# Patient Record
Sex: MALE | Race: WHITE | NOT HISPANIC OR LATINO | Employment: OTHER | URBAN - NONMETROPOLITAN AREA
[De-identification: names, ages, dates, MRNs, and addresses within clinical notes are randomized per-mention and may not be internally consistent; named-entity substitution may affect disease eponyms.]

---

## 2017-06-15 ENCOUNTER — APPOINTMENT (OUTPATIENT)
Dept: GENERAL RADIOLOGY | Facility: HOSPITAL | Age: 72
End: 2017-06-15

## 2017-06-15 ENCOUNTER — HOSPITAL ENCOUNTER (INPATIENT)
Facility: HOSPITAL | Age: 72
LOS: 2 days | Discharge: HOME | End: 2017-06-17
Attending: EMERGENCY MEDICINE | Admitting: INTERNAL MEDICINE

## 2017-06-15 DIAGNOSIS — I21.01 ST ELEVATION MYOCARDIAL INFARCTION INVOLVING LEFT MAIN CORONARY ARTERY (HCC): Primary | ICD-10-CM

## 2017-06-15 PROBLEM — I21.3 STEMI (ST ELEVATION MYOCARDIAL INFARCTION): Status: ACTIVE | Noted: 2017-06-15

## 2017-06-15 LAB
ACT BLD: 125 SECONDS (ref 82–152)
ACT BLD: 158 SECONDS (ref 82–152)
ACT BLD: 158 SECONDS (ref 82–152)
ACT BLD: 197 SECONDS (ref 82–152)
ACT BLD: 219 SECONDS (ref 82–152)
ALBUMIN SERPL-MCNC: 4.7 G/DL (ref 3.4–4.8)
ALBUMIN/GLOB SERPL: 1.2 G/DL (ref 1.5–2.5)
ALP SERPL-CCNC: 104 U/L (ref 40–129)
ALT SERPL W P-5'-P-CCNC: 66 U/L (ref 10–44)
ANION GAP SERPL CALCULATED.3IONS-SCNC: 8.7 MMOL/L (ref 3.6–11.2)
AST SERPL-CCNC: 71 U/L (ref 10–34)
BASOPHILS # BLD AUTO: 0.05 10*3/MM3 (ref 0–0.3)
BASOPHILS NFR BLD AUTO: 0.4 % (ref 0–2)
BILIRUB SERPL-MCNC: 0.8 MG/DL (ref 0.2–1.8)
BUN BLD-MCNC: 19 MG/DL (ref 7–21)
BUN/CREAT SERPL: 16.8 (ref 7–25)
CALCIUM SPEC-SCNC: 10.4 MG/DL (ref 7.7–10)
CHLORIDE SERPL-SCNC: 103 MMOL/L (ref 99–112)
CHOLEST SERPL-MCNC: 235 MG/DL (ref 0–200)
CK SERPL-CCNC: 1523 U/L (ref 24–204)
CO2 SERPL-SCNC: 27.3 MMOL/L (ref 24.3–31.9)
CREAT BLD-MCNC: 1.13 MG/DL (ref 0.43–1.29)
DEPRECATED RDW RBC AUTO: 43.4 FL (ref 37–54)
EOSINOPHIL # BLD AUTO: 0.07 10*3/MM3 (ref 0–0.7)
EOSINOPHIL NFR BLD AUTO: 0.6 % (ref 0–7)
ERYTHROCYTE [DISTWIDTH] IN BLOOD BY AUTOMATED COUNT: 13.1 % (ref 11.5–14.5)
GFR SERPL CREATININE-BSD FRML MDRD: 64 ML/MIN/1.73
GLOBULIN UR ELPH-MCNC: 4 GM/DL
GLUCOSE BLD-MCNC: 246 MG/DL (ref 70–110)
HCT VFR BLD AUTO: 49.9 % (ref 42–52)
HDLC SERPL-MCNC: 42 MG/DL (ref 60–100)
HGB BLD-MCNC: 16.6 G/DL (ref 14–18)
HOLD SPECIMEN: NORMAL
HOLD SPECIMEN: NORMAL
IMM GRANULOCYTES # BLD: 0.03 10*3/MM3 (ref 0–0.03)
IMM GRANULOCYTES NFR BLD: 0.2 % (ref 0–0.5)
INR PPP: 0.97 (ref 0.8–1.1)
LDLC SERPL CALC-MCNC: 159 MG/DL (ref 0–100)
LDLC/HDLC SERPL: 3.78 {RATIO}
LYMPHOCYTES # BLD AUTO: 1.78 10*3/MM3 (ref 1–3)
LYMPHOCYTES NFR BLD AUTO: 14.3 % (ref 16–46)
MCH RBC QN AUTO: 30.4 PG (ref 27–33)
MCHC RBC AUTO-ENTMCNC: 33.3 G/DL (ref 33–37)
MCV RBC AUTO: 91.4 FL (ref 80–94)
MONOCYTES # BLD AUTO: 0.73 10*3/MM3 (ref 0.1–0.9)
MONOCYTES NFR BLD AUTO: 5.9 % (ref 0–12)
NEUTROPHILS # BLD AUTO: 9.78 10*3/MM3 (ref 1.4–6.5)
NEUTROPHILS NFR BLD AUTO: 78.6 % (ref 40–75)
OSMOLALITY SERPL CALC.SUM OF ELEC: 288 MOSM/KG (ref 273–305)
PLATELET # BLD AUTO: 235 10*3/MM3 (ref 130–400)
PMV BLD AUTO: 10.2 FL (ref 6–10)
POTASSIUM BLD-SCNC: 4.1 MMOL/L (ref 3.5–5.3)
PROT SERPL-MCNC: 8.7 G/DL (ref 6–8)
PROTHROMBIN TIME: 10.7 SECONDS (ref 9.8–11.9)
RBC # BLD AUTO: 5.46 10*6/MM3 (ref 4.7–6.1)
SODIUM BLD-SCNC: 139 MMOL/L (ref 135–153)
TRIGL SERPL-MCNC: 171 MG/DL (ref 0–150)
TROPONIN I SERPL-MCNC: 3.43 NG/ML
TROPONIN I SERPL-MCNC: >50 NG/ML
VLDLC SERPL-MCNC: 34.2 MG/DL
WBC NRBC COR # BLD: 12.44 10*3/MM3 (ref 4.5–12.5)
WHOLE BLOOD HOLD SPECIMEN: NORMAL
WHOLE BLOOD HOLD SPECIMEN: NORMAL

## 2017-06-15 PROCEDURE — 0 IOPAMIDOL PER 1 ML: Performed by: INTERNAL MEDICINE

## 2017-06-15 PROCEDURE — 25010000002 HEPARIN (PORCINE) PER 1000 UNITS: Performed by: INTERNAL MEDICINE

## 2017-06-15 PROCEDURE — 93005 ELECTROCARDIOGRAM TRACING: CPT | Performed by: EMERGENCY MEDICINE

## 2017-06-15 PROCEDURE — 93454 CORONARY ARTERY ANGIO S&I: CPT | Performed by: INTERNAL MEDICINE

## 2017-06-15 PROCEDURE — C1874 STENT, COATED/COV W/DEL SYS: HCPCS | Performed by: INTERNAL MEDICINE

## 2017-06-15 PROCEDURE — 84484 ASSAY OF TROPONIN QUANT: CPT | Performed by: EMERGENCY MEDICINE

## 2017-06-15 PROCEDURE — 4A023N7 MEASUREMENT OF CARDIAC SAMPLING AND PRESSURE, LEFT HEART, PERCUTANEOUS APPROACH: ICD-10-PCS | Performed by: INTERNAL MEDICINE

## 2017-06-15 PROCEDURE — 25010000002 MIDAZOLAM PER 1 MG: Performed by: INTERNAL MEDICINE

## 2017-06-15 PROCEDURE — C1769 GUIDE WIRE: HCPCS | Performed by: INTERNAL MEDICINE

## 2017-06-15 PROCEDURE — 85347 COAGULATION TIME ACTIVATED: CPT

## 2017-06-15 PROCEDURE — 85610 PROTHROMBIN TIME: CPT | Performed by: EMERGENCY MEDICINE

## 2017-06-15 PROCEDURE — 93458 L HRT ARTERY/VENTRICLE ANGIO: CPT | Performed by: INTERNAL MEDICINE

## 2017-06-15 PROCEDURE — 80061 LIPID PANEL: CPT | Performed by: EMERGENCY MEDICINE

## 2017-06-15 PROCEDURE — 25010000002 DIPHENHYDRAMINE PER 50 MG: Performed by: INTERNAL MEDICINE

## 2017-06-15 PROCEDURE — 92941 PRQ TRLML REVSC TOT OCCL AMI: CPT | Performed by: INTERNAL MEDICINE

## 2017-06-15 PROCEDURE — C1887 CATHETER, GUIDING: HCPCS | Performed by: INTERNAL MEDICINE

## 2017-06-15 PROCEDURE — 80053 COMPREHEN METABOLIC PANEL: CPT | Performed by: EMERGENCY MEDICINE

## 2017-06-15 PROCEDURE — 25010000002 FENTANYL CITRATE (PF) 100 MCG/2ML SOLUTION: Performed by: INTERNAL MEDICINE

## 2017-06-15 PROCEDURE — B2151ZZ FLUOROSCOPY OF LEFT HEART USING LOW OSMOLAR CONTRAST: ICD-10-PCS | Performed by: INTERNAL MEDICINE

## 2017-06-15 PROCEDURE — 82550 ASSAY OF CK (CPK): CPT | Performed by: INTERNAL MEDICINE

## 2017-06-15 PROCEDURE — 85025 COMPLETE CBC W/AUTO DIFF WBC: CPT | Performed by: EMERGENCY MEDICINE

## 2017-06-15 PROCEDURE — C1725 CATH, TRANSLUMIN NON-LASER: HCPCS | Performed by: INTERNAL MEDICINE

## 2017-06-15 PROCEDURE — 0270346 DILATION OF CORONARY ARTERY, ONE ARTERY, BIFURCATION, WITH DRUG-ELUTING INTRALUMINAL DEVICE, PERCUTANEOUS APPROACH: ICD-10-PCS | Performed by: INTERNAL MEDICINE

## 2017-06-15 PROCEDURE — 99284 EMERGENCY DEPT VISIT MOD MDM: CPT

## 2017-06-15 PROCEDURE — 99223 1ST HOSP IP/OBS HIGH 75: CPT | Performed by: INTERNAL MEDICINE

## 2017-06-15 PROCEDURE — 93005 ELECTROCARDIOGRAM TRACING: CPT | Performed by: INTERNAL MEDICINE

## 2017-06-15 PROCEDURE — 94799 UNLISTED PULMONARY SVC/PX: CPT

## 2017-06-15 PROCEDURE — C1894 INTRO/SHEATH, NON-LASER: HCPCS | Performed by: INTERNAL MEDICINE

## 2017-06-15 PROCEDURE — B2111ZZ FLUOROSCOPY OF MULTIPLE CORONARY ARTERIES USING LOW OSMOLAR CONTRAST: ICD-10-PCS | Performed by: INTERNAL MEDICINE

## 2017-06-15 PROCEDURE — 84484 ASSAY OF TROPONIN QUANT: CPT | Performed by: INTERNAL MEDICINE

## 2017-06-15 PROCEDURE — 25010000002 HEPARIN (PORCINE) PER 1000 UNITS: Performed by: EMERGENCY MEDICINE

## 2017-06-15 PROCEDURE — C9606 PERC D-E COR REVASC W AMI S: HCPCS | Performed by: INTERNAL MEDICINE

## 2017-06-15 DEVICE — XIENCE ALPINE EVEROLIMUS ELUTING CORONARY STENT SYSTEM 3.00 MM X 38 MM / RAPID-EXCHANGE
Type: IMPLANTABLE DEVICE | Status: FUNCTIONAL
Brand: XIENCE ALPINE

## 2017-06-15 RX ORDER — ASPIRIN 325 MG
325 TABLET, DELAYED RELEASE (ENTERIC COATED) ORAL DAILY
Status: DISCONTINUED | OUTPATIENT
Start: 2017-06-16 | End: 2017-06-17 | Stop reason: HOSPADM

## 2017-06-15 RX ORDER — SODIUM CHLORIDE 0.9 % (FLUSH) 0.9 %
1-10 SYRINGE (ML) INJECTION AS NEEDED
Status: DISCONTINUED | OUTPATIENT
Start: 2017-06-15 | End: 2017-06-17 | Stop reason: HOSPADM

## 2017-06-15 RX ORDER — NITROGLYCERIN 20 MG/100ML
5-200 INJECTION INTRAVENOUS
Status: DISCONTINUED | OUTPATIENT
Start: 2017-06-15 | End: 2017-06-16

## 2017-06-15 RX ORDER — HYDRALAZINE HYDROCHLORIDE 20 MG/ML
10 INJECTION INTRAMUSCULAR; INTRAVENOUS ONCE
Status: DISCONTINUED | OUTPATIENT
Start: 2017-06-15 | End: 2017-06-17 | Stop reason: HOSPADM

## 2017-06-15 RX ORDER — FENTANYL CITRATE 50 UG/ML
INJECTION, SOLUTION INTRAMUSCULAR; INTRAVENOUS AS NEEDED
Status: DISCONTINUED | OUTPATIENT
Start: 2017-06-15 | End: 2017-06-15 | Stop reason: HOSPADM

## 2017-06-15 RX ORDER — SODIUM CHLORIDE 9 MG/ML
100 INJECTION, SOLUTION INTRAVENOUS CONTINUOUS
Status: ACTIVE | OUTPATIENT
Start: 2017-06-15 | End: 2017-06-16

## 2017-06-15 RX ORDER — HEPARIN SODIUM 5000 [USP'U]/ML
INJECTION, SOLUTION INTRAVENOUS; SUBCUTANEOUS
Status: DISPENSED
Start: 2017-06-15 | End: 2017-06-16

## 2017-06-15 RX ORDER — CLOPIDOGREL BISULFATE 75 MG/1
600 TABLET ORAL ONCE
Status: DISCONTINUED | OUTPATIENT
Start: 2017-06-15 | End: 2017-06-15 | Stop reason: SDUPTHER

## 2017-06-15 RX ORDER — NITROGLYCERIN 0.4 MG/1
0.4 TABLET SUBLINGUAL
Status: DISCONTINUED | OUTPATIENT
Start: 2017-06-15 | End: 2017-06-17 | Stop reason: HOSPADM

## 2017-06-15 RX ORDER — HYDROCODONE BITARTRATE AND ACETAMINOPHEN 7.5; 325 MG/1; MG/1
1 TABLET ORAL EVERY 4 HOURS PRN
Status: DISCONTINUED | OUTPATIENT
Start: 2017-06-15 | End: 2017-06-17 | Stop reason: HOSPADM

## 2017-06-15 RX ORDER — HEPARIN SODIUM 1000 [USP'U]/ML
INJECTION, SOLUTION INTRAVENOUS; SUBCUTANEOUS AS NEEDED
Status: DISCONTINUED | OUTPATIENT
Start: 2017-06-15 | End: 2017-06-15 | Stop reason: HOSPADM

## 2017-06-15 RX ORDER — LIDOCAINE HYDROCHLORIDE 20 MG/ML
INJECTION, SOLUTION INFILTRATION; PERINEURAL AS NEEDED
Status: DISCONTINUED | OUTPATIENT
Start: 2017-06-15 | End: 2017-06-15 | Stop reason: HOSPADM

## 2017-06-15 RX ORDER — HEPARIN SODIUM 5000 [USP'U]/ML
26.7 INJECTION, SOLUTION INTRAVENOUS; SUBCUTANEOUS ONCE
Status: COMPLETED | OUTPATIENT
Start: 2017-06-15 | End: 2017-06-15

## 2017-06-15 RX ORDER — CLOPIDOGREL BISULFATE 75 MG/1
600 TABLET ORAL ONCE
Status: COMPLETED | OUTPATIENT
Start: 2017-06-15 | End: 2017-06-15

## 2017-06-15 RX ORDER — MIDAZOLAM HYDROCHLORIDE 1 MG/ML
INJECTION INTRAMUSCULAR; INTRAVENOUS AS NEEDED
Status: DISCONTINUED | OUTPATIENT
Start: 2017-06-15 | End: 2017-06-15 | Stop reason: HOSPADM

## 2017-06-15 RX ORDER — NITROGLYCERIN 0.4 MG/1
TABLET SUBLINGUAL
Status: COMPLETED
Start: 2017-06-15 | End: 2017-06-15

## 2017-06-15 RX ORDER — ATORVASTATIN CALCIUM 40 MG/1
40 TABLET, FILM COATED ORAL NIGHTLY
Status: DISCONTINUED | OUTPATIENT
Start: 2017-06-15 | End: 2017-06-17 | Stop reason: HOSPADM

## 2017-06-15 RX ORDER — TAMSULOSIN HYDROCHLORIDE 0.4 MG/1
0.4 CAPSULE ORAL NIGHTLY
Status: DISCONTINUED | OUTPATIENT
Start: 2017-06-15 | End: 2017-06-17 | Stop reason: HOSPADM

## 2017-06-15 RX ORDER — LISINOPRIL 2.5 MG/1
5 TABLET ORAL DAILY
Status: DISCONTINUED | OUTPATIENT
Start: 2017-06-15 | End: 2017-06-17 | Stop reason: HOSPADM

## 2017-06-15 RX ORDER — MULTIPLE VITAMINS W/ MINERALS TAB 9MG-400MCG
1 TAB ORAL DAILY
COMMUNITY
End: 2017-08-07

## 2017-06-15 RX ORDER — SODIUM CHLORIDE 9 MG/ML
100 INJECTION, SOLUTION INTRAVENOUS CONTINUOUS
Status: DISCONTINUED | OUTPATIENT
Start: 2017-06-15 | End: 2017-06-15 | Stop reason: SDUPTHER

## 2017-06-15 RX ORDER — TAMSULOSIN HYDROCHLORIDE 0.4 MG/1
1 CAPSULE ORAL NIGHTLY
COMMUNITY
End: 2020-06-18

## 2017-06-15 RX ORDER — SODIUM CHLORIDE 9 MG/ML
INJECTION, SOLUTION INTRAVENOUS CONTINUOUS PRN
Status: DISCONTINUED | OUTPATIENT
Start: 2017-06-15 | End: 2017-06-15 | Stop reason: HOSPADM

## 2017-06-15 RX ORDER — ASPIRIN 81 MG/1
TABLET, CHEWABLE ORAL
Status: COMPLETED
Start: 2017-06-15 | End: 2017-06-15

## 2017-06-15 RX ORDER — CARVEDILOL 3.12 MG/1
3.12 TABLET ORAL 2 TIMES DAILY
Status: DISCONTINUED | OUTPATIENT
Start: 2017-06-15 | End: 2017-06-16

## 2017-06-15 RX ORDER — CLOPIDOGREL BISULFATE 75 MG/1
TABLET ORAL
Status: COMPLETED
Start: 2017-06-15 | End: 2017-06-15

## 2017-06-15 RX ORDER — DIPHENHYDRAMINE HYDROCHLORIDE 50 MG/ML
INJECTION INTRAMUSCULAR; INTRAVENOUS AS NEEDED
Status: DISCONTINUED | OUTPATIENT
Start: 2017-06-15 | End: 2017-06-15 | Stop reason: HOSPADM

## 2017-06-15 RX ORDER — SODIUM CHLORIDE 0.9 % (FLUSH) 0.9 %
10 SYRINGE (ML) INJECTION AS NEEDED
Status: DISCONTINUED | OUTPATIENT
Start: 2017-06-15 | End: 2017-06-17 | Stop reason: HOSPADM

## 2017-06-15 RX ORDER — CLOPIDOGREL BISULFATE 75 MG/1
75 TABLET ORAL DAILY
Status: DISCONTINUED | OUTPATIENT
Start: 2017-06-16 | End: 2017-06-17 | Stop reason: HOSPADM

## 2017-06-15 RX ORDER — ASPIRIN 81 MG/1
324 TABLET, CHEWABLE ORAL ONCE
Status: COMPLETED | OUTPATIENT
Start: 2017-06-15 | End: 2017-06-15

## 2017-06-15 RX ORDER — NITROGLYCERIN 5 MG/ML
INJECTION, SOLUTION INTRAVENOUS AS NEEDED
Status: DISCONTINUED | OUTPATIENT
Start: 2017-06-15 | End: 2017-06-15 | Stop reason: HOSPADM

## 2017-06-15 RX ORDER — ACETAMINOPHEN 325 MG/1
650 TABLET ORAL EVERY 4 HOURS PRN
Status: DISCONTINUED | OUTPATIENT
Start: 2017-06-15 | End: 2017-06-17 | Stop reason: HOSPADM

## 2017-06-15 RX ORDER — ASPIRIN 81 MG/1
81 TABLET ORAL DAILY
COMMUNITY
End: 2017-06-17 | Stop reason: HOSPADM

## 2017-06-15 RX ADMIN — ASPIRIN 324 MG: 81 TABLET, CHEWABLE ORAL at 13:39

## 2017-06-15 RX ADMIN — NITROGLYCERIN 10 MCG/MIN: 20 INJECTION INTRAVENOUS at 16:30

## 2017-06-15 RX ADMIN — NITROGLYCERIN 0.4 MG: 0.4 TABLET SUBLINGUAL at 13:39

## 2017-06-15 RX ADMIN — TAMSULOSIN HYDROCHLORIDE 0.4 MG: 0.4 CAPSULE ORAL at 20:07

## 2017-06-15 RX ADMIN — CLOPIDOGREL BISULFATE 600 MG: 75 TABLET, FILM COATED ORAL at 13:39

## 2017-06-15 RX ADMIN — CLOPIDOGREL BISULFATE 600 MG: 75 TABLET ORAL at 13:39

## 2017-06-15 RX ADMIN — ATORVASTATIN CALCIUM 40 MG: 40 TABLET, FILM COATED ORAL at 20:07

## 2017-06-15 RX ADMIN — SODIUM CHLORIDE 100 ML/HR: 9 INJECTION, SOLUTION INTRAVENOUS at 18:14

## 2017-06-15 RX ADMIN — HEPARIN SODIUM 4000 UNITS: 5000 INJECTION, SOLUTION INTRAVENOUS; SUBCUTANEOUS at 13:42

## 2017-06-15 NOTE — H&P
Chief complaint:  Chest pain    History of present illness:  Mr. Roth is an interesting 72-year-old gentleman who presents with a history of chest pain.  He notes with chest discomfort has been persistent over the past week.  His symptoms became severe and he presented to the emergency department today.  He denies history of known coronary artery disease or prior myocardial infarction.  He denies a history of congestive heart failure.  He denies any prior history of cardiac arrhythmias.    His 14 point review of systems is negative as was otherwise mentioned in history of present illness    Past medical history:  Obesity  BPH    Current medications:  Flomax 0.4 mg daily  Aspirin 81 mg daily    He has no known drug allergies.    Social history is notable for no significant tobacco, alcohol or illicit drug use    Family history is unremarkable for premature coronary artery disease    Current physical examination:  Blood pressure is 150/100 and heart rate is 100  In general he is an obese gentleman in no apparent distress, alert and oriented ×3  HEENT exam reveals oral mucosa to be normal.  Has no significant JVD or hepatojugular reflex.  He has no carotid bruits noted.  Chest is symmetric  Lungs are clear to auscultation without crackles or wheezes  Cardiac exam reveals an intact PMI with a regular rate and rhythm.  There is a normal S1 and S2.  There is no S3 or S4.  There are no murmurs rubs or bruits.  Abdominal exam reveals a soft flat belly which is nontender with normal bowel sounds and no paraspinal mainly.  Is no mass noted.  His abdominal aorta is not palpable  Extremities show no clubbing cyanosis or edema  Peripheral pulses are intact  Muscle skeletal exam is normal  Neurologic exam is normal    Currently laboratory data:  EKG from now reveals normal sinus rhythm with ST elevation anterior leads    Final impression and plan:  Overall it is my impression that Mr. Roth is presenting with an acute  anterior wall myocardial infarction and will be taken emergently to the cardiac catheterization laboratory.  He has been given informed consent apprising him of the risk of heart attack, stroke and death.  He also has been instructed in the importance of dual antiplatelet therapy.  He understands all this and agrees to the plan as outlined.

## 2017-06-15 NOTE — ED PROVIDER NOTES
Subjective   Patient is a 72 y.o. male presenting with chest pain.   Chest Pain   Pain location:  Substernal area  Pain quality: aching    Pain radiates to:  Does not radiate  Pain severity:  Severe  Duration:  1 week  Progression:  Worsening  Relieved by:  Nothing  Worsened by:  Nothing  Ineffective treatments:  None tried      Review of Systems   Unable to perform ROS: Acuity of condition   Cardiovascular: Positive for chest pain.       Past Medical History:   Diagnosis Date   • Arthritis    • Coronary artery disease    • Diabetes mellitus        No Known Allergies    Past Surgical History:   Procedure Laterality Date   • BACK SURGERY     • HERNIA REPAIR     • TONSILLECTOMY     • VASECTOMY         Family History   Problem Relation Age of Onset   • Cancer Mother    • Early death Mother    • Heart disease Father    • Diabetes Sister    • Heart disease Paternal Uncle    • Diabetes Maternal Grandfather        Social History     Social History   • Marital status:      Spouse name: N/A   • Number of children: N/A   • Years of education: N/A     Social History Main Topics   • Smoking status: Former Smoker     Packs/day: 1.00     Years: 15.00     Types: Cigarettes, Pipe, Cigars   • Smokeless tobacco: Current User     Types: Chew      Comment: quit smoking, still chews   • Alcohol use No   • Drug use: No   • Sexual activity: Defer     Other Topics Concern   • None     Social History Narrative   • None         Objective   Physical Exam   Constitutional: He is oriented to person, place, and time. He appears well-developed and well-nourished. No distress.   HENT:   Head: Normocephalic and atraumatic.   Right Ear: External ear normal.   Left Ear: External ear normal.   Nose: Nose normal.   Eyes: Conjunctivae and EOM are normal. Pupils are equal, round, and reactive to light.   Neck: Normal range of motion. Neck supple. No JVD present. No tracheal deviation present.   Cardiovascular:   No murmur heard.  Pulmonary/Chest:  Effort normal and breath sounds normal. No respiratory distress. He has no wheezes.   Abdominal: Soft. Bowel sounds are normal. There is no tenderness.   Musculoskeletal: Normal range of motion. He exhibits no edema or deformity.   Neurological: He is alert and oriented to person, place, and time. No cranial nerve deficit.   Skin: Skin is warm and dry. No rash noted. He is not diaphoretic. No erythema. No pallor.   Psychiatric: He has a normal mood and affect. His behavior is normal. Thought content normal.   Nursing note and vitals reviewed.      Procedures         LAB RESULTS  Lab Results (last 24 hours)     Procedure Component Value Units Date/Time    CBC & Differential [[id number]] Collected:  06/15/17 1349    Specimen:  Blood Updated:  06/15/17 1407    Narrative:       The following orders were created for panel order CBC & Differential.  Procedure                               Abnormality         Status                     ---------                               -----------         ------                     CBC Auto Differential[[id number]]        Abnormal            Final result                 Please view results for these tests on the individual orders.    Troponin [[id number]]  (Abnormal) Collected:  06/15/17 1349    Specimen:  Blood from Arm, Right Updated:  06/15/17 1440     Troponin I 3.428 (C) ng/mL     Narrative:       Ultra Troponin I Reference Range:         <=0.039 ng/mL: Negative    0.04-0.779 ng/mL: Indeterminate Range. Suspicious of MI.  Clinical correlation required.       >=0.78  ng/mL: Consistent with myocardial injury.  Clinical correlation required.    Comprehensive Metabolic Panel [[id number]]  (Abnormal) Collected:  06/15/17 1349    Specimen:  Blood from Arm, Right Updated:  06/15/17 1426     Glucose 246 (H) mg/dL      BUN 19 mg/dL      Creatinine 1.13 mg/dL      Sodium 139 mmol/L      Potassium 4.1 mmol/L       1+ Hemolysis         Chloride 103 mmol/L      CO2 27.3 mmol/L      Calcium 10.4  (H) mg/dL      Total Protein 8.7 (H) g/dL      Albumin 4.70 g/dL      ALT (SGPT) 66 (H) U/L      AST (SGOT) 71 (H) U/L      Alkaline Phosphatase 104 U/L       Note New Reference Ranges        Total Bilirubin 0.8 mg/dL      eGFR Non African Amer 64 mL/min/1.73      Globulin 4.0 gm/dL      A/G Ratio 1.2 (L) g/dL      BUN/Creatinine Ratio 16.8     Anion Gap 8.7 mmol/L     Narrative:       The MDRD GFR formula is only valid for adults with stable renal function between ages 18 and 70.    Protime-INR [[id number]]  (Normal) Collected:  06/15/17 1349    Specimen:  Blood from Arm, Right Updated:  06/15/17 1417     Protime 10.7 Seconds      INR 0.97    Narrative:       Patients not on anticoagulant therapy:    INR 0.90-1.10     Suggested INR therapeutic range for stable oral anticoagulant therapy:             Routine therapy                      2.00-3.00           Recurrent MI                         2.50-3.50           Mechanical prosthetic valve          2.50-3.50    CBC Auto Differential [[id number]]  (Abnormal) Collected:  06/15/17 1349    Specimen:  Blood from Arm, Right Updated:  06/15/17 1407     WBC 12.44 10*3/mm3      RBC 5.46 10*6/mm3      Hemoglobin 16.6 g/dL      Hematocrit 49.9 %      MCV 91.4 fL      MCH 30.4 pg      MCHC 33.3 g/dL      RDW 13.1 %      RDW-SD 43.4 fl      MPV 10.2 (H) fL      Platelets 235 10*3/mm3      Neutrophil % 78.6 (H) %      Lymphocyte % 14.3 (L) %      Monocyte % 5.9 %      Eosinophil % 0.6 %      Basophil % 0.4 %      Immature Grans % 0.2 %      Neutrophils, Absolute 9.78 (H) 10*3/mm3      Lymphocytes, Absolute 1.78 10*3/mm3      Monocytes, Absolute 0.73 10*3/mm3      Eosinophils, Absolute 0.07 10*3/mm3      Basophils, Absolute 0.05 10*3/mm3      Immature Grans, Absolute 0.03 10*3/mm3     Lipid Panel [[id number]]  (Abnormal) Collected:  06/15/17 1349    Specimen:  Blood from Arm, Right Updated:  06/15/17 1449     Total Cholesterol 235 (H) mg/dL      Triglycerides 171 (H) mg/dL      HDL  Cholesterol 42 (L) mg/dL      LDL Cholesterol  159 (H) mg/dL      VLDL Cholesterol 34.2 mg/dL      LDL/HDL Ratio 3.78    Narrative:       Cholesterol Reference Ranges  (U.S. Department of Health and Human Services ATP III Classifications)    Desirable          <200 mg/dL  Borderline High    200-239 mg/dL  High Risk          >240 mg/dL      Triglyceride Reference Ranges  (U.S. Department of Health and Human Services ATP III Classifications)    Normal           <150 mg/dL  Borderline High  150-199 mg/dL  High             200-499 mg/dL  Very High        >500 mg/dL    HDL Reference Ranges  (U.S. Department of Health and Human Services ATP III Classifcations)    Low     <40 mg/dl (major risk factor for CHD)  High    >60 mg/dl ('negative' risk factor for CHD)        LDL Reference Ranges  (U.S. Department of Health and Human Services ATP III Classifcations)    Optimal          <100 mg/dL  Near Optimal     100-129 mg/dL  Borderline High  130-159 mg/dL  High             160-189 mg/dL  Very High        >189 mg/dL    POC Activated Clotting Time [[id number]]  (Abnormal) Collected:  06/15/17 1403    Specimen:  Blood Updated:  06/15/17 1446     Activated Clotting Time  158 (H) Seconds       Serial Number: [id number]    : [id number]       POC Activated Clotting Time [[id number]]  (Abnormal) Collected:  06/15/17 1417    Specimen:  Blood Updated:  06/15/17 1446     Activated Clotting Time  219 (H) Seconds       Serial Number: [id number]    : [id number]       POC Activated Clotting Time [[id number]]  (Abnormal) Collected:  06/15/17 1433    Specimen:  Blood Updated:  06/15/17 1446     Activated Clotting Time  197 (H) Seconds       Serial Number: [id number]    : [id number]             I ordered the above labs and reviewed the results    RADIOLOGY  No orders to display        I ordered the above noted radiological studies. Interpreted by radiologist. Reviewed by me in PACS.     ED Course  ED Course   Value Comment By Time   ECG 12 Lead EKG  shows ST elevations in V3 45 and in lead 1 consistent with STEMI Brian Davis MD 06/15 8969       After EKG done in the ST elevations one push was done Dr. Mcneill notified any protocol initiated              MDM    Final diagnoses:   ST elevation myocardial infarction involving left main coronary artery       Documentation assistance provided by aron Davis.  Information recorded by the scribe was done at my direction and has been verified and validated by me.     Elia Davis  06/15/17 9691       Brian Davis MD  06/15/17 0463

## 2017-06-15 NOTE — ED NOTES
Groin prep complete, defib pads placed, pt transported to cath lab.     Vivi Carcamo RN  06/15/17 4263

## 2017-06-15 NOTE — PLAN OF CARE
Problem: Patient Care Overview (Adult)  Goal: Discharge Needs Assessment  Outcome: Ongoing (interventions implemented as appropriate)    06/15/17 1559 06/15/17 1939   Discharge Needs Assessment   Discharge Planning Comments --  pt emergently taken to cath lab today and a stent was placed in pts LDA   Living Environment   Transportation Available car --          Problem: Acute Coronary Syndrome (ACS) (Adult)  Goal: Signs and Symptoms of Listed Potential Problems Will be Absent or Manageable (Acute Coronary Syndrome)  Outcome: Ongoing (interventions implemented as appropriate)    06/15/17 1850   Acute Coronary Syndrome (ACS)   Problems Assessed (Acute Coronary Syndrome (ACS)) chest pain (angina);dysrhythmia/arrhythmia   Problems Present (Acute Coronary Syndrome (ACS)) chest pain (angina);ischemia leading to infarction         Problem: Pain, Acute (Adult)  Goal: Identify Related Risk Factors and Signs and Symptoms  Outcome: Ongoing (interventions implemented as appropriate)    06/15/17 1833   Pain, Acute   Related Risk Factors (Acute Pain) disease process   Signs and Symptoms (Acute Pain) fatigue/weakness;verbalization of pain descriptors       Goal: Acceptable Pain Control/Comfort Level  Outcome: Ongoing (interventions implemented as appropriate)    06/15/17 1833   Pain, Acute (Adult)   Acceptable Pain Control/Comfort Level making progress toward outcome

## 2017-06-15 NOTE — ED NOTES
Pt reports he has been experiencing chest pain for about 8 days, states pain is somewhat relieved after belching, reports pain worsened today.     Vivi Carcamo RN  06/15/17 1356

## 2017-06-15 NOTE — ED NOTES
One push per dr.douglas barone notified      Brian Hilliard  06/15/17 3781       Brian Hilliard  06/15/17 4184

## 2017-06-15 NOTE — ED NOTES
EKG performed by [facility] at 1325 and shown to Dr. Davis.      Ebony Alcaraz  06/15/17 2691

## 2017-06-16 ENCOUNTER — APPOINTMENT (OUTPATIENT)
Dept: CARDIOLOGY | Facility: HOSPITAL | Age: 72
End: 2017-06-16
Attending: INTERNAL MEDICINE

## 2017-06-16 LAB
ALBUMIN SERPL-MCNC: 3.5 G/DL (ref 3.4–4.8)
ALBUMIN/GLOB SERPL: 1.2 G/DL (ref 1.5–2.5)
ALP SERPL-CCNC: 76 U/L (ref 40–129)
ALT SERPL W P-5'-P-CCNC: 62 U/L (ref 10–44)
ANION GAP SERPL CALCULATED.3IONS-SCNC: 3.5 MMOL/L (ref 3.6–11.2)
AST SERPL-CCNC: 144 U/L (ref 10–34)
BILIRUB SERPL-MCNC: 1.1 MG/DL (ref 0.2–1.8)
BNP SERPL-MCNC: 271 PG/ML (ref 0–100)
BUN BLD-MCNC: 14 MG/DL (ref 7–21)
BUN/CREAT SERPL: 15.1 (ref 7–25)
CALCIUM SPEC-SCNC: 8.9 MG/DL (ref 7.7–10)
CHLORIDE SERPL-SCNC: 105 MMOL/L (ref 99–112)
CHOLEST SERPL-MCNC: 171 MG/DL (ref 0–200)
CK SERPL-CCNC: 1090 U/L (ref 24–204)
CK SERPL-CCNC: 728 U/L (ref 24–204)
CO2 SERPL-SCNC: 27.5 MMOL/L (ref 24.3–31.9)
CREAT BLD-MCNC: 0.93 MG/DL (ref 0.43–1.29)
DEPRECATED RDW RBC AUTO: 43.6 FL (ref 37–54)
ERYTHROCYTE [DISTWIDTH] IN BLOOD BY AUTOMATED COUNT: 13.2 % (ref 11.5–14.5)
GFR SERPL CREATININE-BSD FRML MDRD: 80 ML/MIN/1.73
GLOBULIN UR ELPH-MCNC: 3 GM/DL
GLUCOSE BLD-MCNC: 230 MG/DL (ref 70–110)
GLUCOSE BLDC GLUCOMTR-MCNC: 204 MG/DL (ref 70–130)
HBA1C MFR BLD: 8.2 % (ref 4.5–5.7)
HCT VFR BLD AUTO: 41 % (ref 42–52)
HDLC SERPL-MCNC: 31 MG/DL (ref 60–100)
HGB BLD-MCNC: 13.7 G/DL (ref 14–18)
INR PPP: 1 (ref 0.8–1.1)
LDLC SERPL CALC-MCNC: 119 MG/DL (ref 0–100)
LDLC/HDLC SERPL: 3.85 {RATIO}
MCH RBC QN AUTO: 31.1 PG (ref 27–33)
MCHC RBC AUTO-ENTMCNC: 33.4 G/DL (ref 33–37)
MCV RBC AUTO: 93 FL (ref 80–94)
OSMOLALITY SERPL CALC.SUM OF ELEC: 279.7 MOSM/KG (ref 273–305)
PLATELET # BLD AUTO: 182 10*3/MM3 (ref 130–400)
PMV BLD AUTO: 9.9 FL (ref 6–10)
POTASSIUM BLD-SCNC: 3.7 MMOL/L (ref 3.5–5.3)
PROT SERPL-MCNC: 6.5 G/DL (ref 6–8)
PROTHROMBIN TIME: 11.1 SECONDS (ref 9.8–11.9)
RBC # BLD AUTO: 4.41 10*6/MM3 (ref 4.7–6.1)
SODIUM BLD-SCNC: 136 MMOL/L (ref 135–153)
TRIGL SERPL-MCNC: 104 MG/DL (ref 0–150)
TROPONIN I SERPL-MCNC: >50 NG/ML
TROPONIN I SERPL-MCNC: >50 NG/ML
TSH SERPL DL<=0.05 MIU/L-ACNC: 1.8 MIU/ML (ref 0.55–4.78)
VLDLC SERPL-MCNC: 20.8 MG/DL
WBC NRBC COR # BLD: 11.59 10*3/MM3 (ref 4.5–12.5)

## 2017-06-16 PROCEDURE — 83036 HEMOGLOBIN GLYCOSYLATED A1C: CPT | Performed by: INTERNAL MEDICINE

## 2017-06-16 PROCEDURE — 82550 ASSAY OF CK (CPK): CPT | Performed by: INTERNAL MEDICINE

## 2017-06-16 PROCEDURE — 63710000001 INSULIN ASPART PER 5 UNITS: Performed by: INTERNAL MEDICINE

## 2017-06-16 PROCEDURE — 85027 COMPLETE CBC AUTOMATED: CPT | Performed by: INTERNAL MEDICINE

## 2017-06-16 PROCEDURE — 84443 ASSAY THYROID STIM HORMONE: CPT | Performed by: INTERNAL MEDICINE

## 2017-06-16 PROCEDURE — 84484 ASSAY OF TROPONIN QUANT: CPT | Performed by: INTERNAL MEDICINE

## 2017-06-16 PROCEDURE — C8929 TTE W OR WO FOL WCON,DOPPLER: HCPCS

## 2017-06-16 PROCEDURE — 93010 ELECTROCARDIOGRAM REPORT: CPT | Performed by: INTERNAL MEDICINE

## 2017-06-16 PROCEDURE — 80061 LIPID PANEL: CPT | Performed by: INTERNAL MEDICINE

## 2017-06-16 PROCEDURE — 93005 ELECTROCARDIOGRAM TRACING: CPT | Performed by: INTERNAL MEDICINE

## 2017-06-16 PROCEDURE — 80053 COMPREHEN METABOLIC PANEL: CPT | Performed by: INTERNAL MEDICINE

## 2017-06-16 PROCEDURE — 99232 SBSQ HOSP IP/OBS MODERATE 35: CPT | Performed by: INTERNAL MEDICINE

## 2017-06-16 PROCEDURE — 93306 TTE W/DOPPLER COMPLETE: CPT

## 2017-06-16 PROCEDURE — 83880 ASSAY OF NATRIURETIC PEPTIDE: CPT | Performed by: INTERNAL MEDICINE

## 2017-06-16 PROCEDURE — 93306 TTE W/DOPPLER COMPLETE: CPT | Performed by: INTERNAL MEDICINE

## 2017-06-16 PROCEDURE — 82962 GLUCOSE BLOOD TEST: CPT

## 2017-06-16 PROCEDURE — 94799 UNLISTED PULMONARY SVC/PX: CPT

## 2017-06-16 PROCEDURE — 85610 PROTHROMBIN TIME: CPT | Performed by: INTERNAL MEDICINE

## 2017-06-16 RX ORDER — DEXTROSE MONOHYDRATE 25 G/50ML
25 INJECTION, SOLUTION INTRAVENOUS
Status: DISCONTINUED | OUTPATIENT
Start: 2017-06-16 | End: 2017-06-17 | Stop reason: HOSPADM

## 2017-06-16 RX ORDER — NICOTINE POLACRILEX 4 MG
15 LOZENGE BUCCAL
Status: DISCONTINUED | OUTPATIENT
Start: 2017-06-16 | End: 2017-06-17 | Stop reason: HOSPADM

## 2017-06-16 RX ORDER — CARVEDILOL 6.25 MG/1
6.25 TABLET ORAL 2 TIMES DAILY
Status: DISCONTINUED | OUTPATIENT
Start: 2017-06-16 | End: 2017-06-17 | Stop reason: HOSPADM

## 2017-06-16 RX ADMIN — TAMSULOSIN HYDROCHLORIDE 0.4 MG: 0.4 CAPSULE ORAL at 20:43

## 2017-06-16 RX ADMIN — ATORVASTATIN CALCIUM 40 MG: 40 TABLET, FILM COATED ORAL at 20:43

## 2017-06-16 RX ADMIN — LISINOPRIL 5 MG: 2.5 TABLET ORAL at 08:45

## 2017-06-16 RX ADMIN — CARVEDILOL 3.12 MG: 3.12 TABLET, FILM COATED ORAL at 08:45

## 2017-06-16 RX ADMIN — INSULIN ASPART 3 UNITS: 100 INJECTION, SOLUTION INTRAVENOUS; SUBCUTANEOUS at 20:47

## 2017-06-16 RX ADMIN — CLOPIDOGREL BISULFATE 75 MG: 75 TABLET, FILM COATED ORAL at 08:45

## 2017-06-16 RX ADMIN — CARVEDILOL 6.25 MG: 6.25 TABLET, FILM COATED ORAL at 17:55

## 2017-06-16 RX ADMIN — ASPIRIN 325 MG: 325 TABLET, COATED ORAL at 08:45

## 2017-06-16 NOTE — NURSING NOTE
Time In 1640 Time Out 1700      Order received for Cardiac Rehab Consultation.     CR staff will follow up with patient      Information discussed with: Patient        Educated on: Benefits of Exercise,  Educated on Cardiac Rehab and Program Protocol, Brochure and/or educational material provided, Contact information given and Teach Back Verified        Comments: Patient stated we could call him in a couple weeks but he had enough to do around his place to keep him busy. i explained the difference between working outside and a good cardio work out. Verbal teach back verified      Thank you for the referral. Please contact the Cardiac Rehab Dept. (ext. [phone]) with any further questions or concerns.

## 2017-06-16 NOTE — CONSULTS
"Diabetes Education  Assessment/Teaching    Patient Name:  Sofia Roth  YOB: 1945  MRN: [id number]  Admit Date:  6/15/2017      Assessment Date:  6/16/2017       Most Recent Value    General Information      Height  6' (1.829 m)    Height Method  Stated    Weight  (!)  330 lb (150 kg)    Pregnancy Assessment     Diabetes History     What type of diabetes do you have?  Type 2    Length of Diabetes Diagnosis  1 - 5 years    Current DM knowledge  poor    Have you had diabetes education/teaching in the past?  no    What makes it difficult for you to take care of your diabetes or yourself?  not really interested just wanting to go home    Do you have any diabetes complications?  heart disease [STEMI this admit]    Education Preferences     Nutrition Information     Assessment Topics     Healthy Eating - Assessment  Competent    Being Active - Assessment  Competent    Taking Medication - Assessment  Competent    Problem Solving - Assessment  Competent    Reducing Risk - Assessment  Competent    Healthy Coping - Assessment  Competent    Monitoring - Assessment  Competent    DM Goals                Most Recent Value    DM Education Needs     Reducing Risks  A1C testing [A1c 8.20 discussed with wife & daughter after [place] with client, \"thats not too bad\"]    Healthy Eating  Other (comment) [explained that I would not put a consult in for the dietician but the doctor  may have palced an  order ]    Healthy Coping  Other (comment) [\"wanting to go home\"]    Discharge Plan  Home, Follow-up with MD    Motivation  Not interested    Teaching Method  Explanation, Discussion, Handouts    Patient Response  Verbalized understanding            Other Comments:          Electronically signed by:  Alma Acosta RN  06/16/17 3:00 PM  "

## 2017-06-16 NOTE — CONSULTS
Diabetes Education  Assessment/Teaching    Patient Name:  Sofia Roth  YOB: 1945  MRN: [id number]  Admit Date:  6/15/2017      Assessment Date:  6/16/2017       Most Recent Value    General Information      Height  6' (1.829 m)    Height Method  Stated    Weight  (!)  330 lb (150 kg)    Pregnancy Assessment     Diabetes History     Education Preferences     Nutrition Information     Assessment Topics     DM Goals                 Other Comments: A1c 8.20,nurse contacting doctor         Electronically signed by:  Alma Acosta RN  06/16/17 12:54 PM

## 2017-06-16 NOTE — PROGRESS NOTES
Discharge Planning Assessment  [facility]     Patient Name: Sofia Roth  MRN: [id number]  Today's Date: 6/16/2017    Admit Date: 6/15/2017          Discharge Needs Assessment       06/16/17 1242    Living Environment    Lives With child(dale), adult;spouse    Living Arrangements house    Transportation Available car;family or friend will provide    Living Environment    Provides Primary Care For no one    Quality Of Family Relationships helpful;involved;supportive    Able to Return to Prior Living Arrangements yes    Discharge Needs Assessment    Concerns To Be Addressed no discharge needs identified    Readmission Within The Last 30 Days no previous admission in last 30 days    Outpatient/Agency/Support Group Needs --   Pt does not use home health services. Pt denies a need.     Anticipated Changes Related to Illness none    Equipment Currently Used at Home none    Equipment Needed After Discharge none    Discharge Disposition still a patient            Discharge Plan       06/16/17 1243    Case Management/Social Work Plan    Plan Pt lives at home with his spouse, Marion and daughter, Padma.  Pt does not utilize home health and DME.  Pt is mobile and is I with ADL's .  Pt will be transported home via private auto.  SS will continue to follow and will assist as needed.     Patient/Family In Agreement With Plan yes             Demographic Summary       06/16/17 1240    Referral Information    Admission Type inpatient    Referral Source nursing    Reason For Consult discharge planning    Primary Care Physician Information    Name Padma Roth            Functional Status       06/16/17 1241    Functional Status Current    Current Functional Level Comment Pt is mobile and is I with ADL's.             Legal       06/16/17 1241    Legal    Assistance with Managing/Advocating Healthcare Needs --   Pt's POA is his daughter, Padma Roth.          Cecy Up

## 2017-06-16 NOTE — PROGRESS NOTES
LOS: 1 day   Patient Care Team:  Raza Boogie MD as PCP - General (Family Medicine)    Chief Complaint:Sofia Roth a 72 y.o. male presented yesterday relatively late into a large anterior wall myocardial infarction.       Interval History: He feels better today.  He has not had any chest discomfort he denies any heart failure symptoms.  Overall he feels well.      Objective   Vital Signs  Temp:  [98 °F (36.7 °C)-98.5 °F (36.9 °C)] 98.2 °F (36.8 °C)  Heart Rate:  [74-97] 84  Resp:  [16-18] 16  BP: (120-156)/() 136/86    Intake/Output Summary (Last 24 hours) at 06/16/17 1944  Last data filed at 06/16/17 1900   Gross per 24 hour   Intake          1440.05 ml   Output             1480 ml   Net           -39.95 ml       Comfortable NAD  PERRL, conjunctiva clear  Neck supple, no JVD or thyromegaly appreciated  S1/S2 RRR, no m/r/g  Lungs CTA B, normal effort  Abdomen S/NT/ND (+) BS, no HSM appreciated  Extremities warm, no clubbing, cyanosis, or edema  Normal gait  No visible or palpable skin lesions  A/Ox4, mood and affect appropriate  Site of cardiac catheterization is well-healed.    Results Review:        Results from last 7 days  Lab Units 06/16/17  0525 06/15/17  1349   SODIUM mmol/L 136 139   POTASSIUM mmol/L 3.7 4.1   CHLORIDE mmol/L 105 103   TOTAL CO2 mmol/L 27.5 27.3   BUN mg/dL 14 19   CREATININE mg/dL 0.93 1.13   GLUCOSE mg/dL 230* 246*   CALCIUM mg/dL 8.9 10.4*       Results from last 7 days  Lab Units 06/16/17  0525 06/16/17  0054 06/15/17  1858   CK TOTAL U/L 728* 1090* 1523*   TROPONIN I ng/mL >50.000* >50.000* >50.000*       Results from last 7 days  Lab Units 06/16/17  0525 06/15/17  1349   WBC 10*3/mm3 11.59 12.44   HEMOGLOBIN g/dL 13.7* 16.6   HEMATOCRIT % 41.0* 49.9   PLATELETS 10*3/mm3 182 235       Results from last 7 days  Lab Units 06/16/17  0525 06/15/17  1349   INR  1.00 0.97       Results from last 7 days  Lab Units 06/16/17  0525   CHOLESTEROL mg/dL 171           Results  from last 7 days  Lab Units 06/16/17  0525   CHOLESTEROL mg/dL 171   TRIGLYCERIDES mg/dL 104   HDL CHOL mg/dL 31*       I reviewed the patient's new clinical results.  I personally viewed and interpreted the patient's EKG/Telemetry data        Medication Review:     aspirin 325 mg Oral Daily   atorvastatin 40 mg Oral Nightly   carvedilol 6.25 mg Oral BID   clopidogrel 75 mg Oral Daily   hydrALAZINE 10 mg Intravenous Once   insulin aspart 0-7 Units Subcutaneous 4x Daily AC & at Bedtime   insulin aspart 0-7 Units Subcutaneous Once   lisinopril 5 mg Oral Daily   Pharmacy Meds to Bed Consult  Does not apply Daily   tamsulosin 0.4 mg Oral Nightly            Active Problems:    STEMI (ST elevation myocardial infarction)      Assessment/Plan   CAD  In short I feel that Mr. Roth is currently stable.  He is not having any angina or anginal-like symptoms he is status post adequate revascularization.  I will continue his current medications as is.    Ischemic Cardiomyopathy  In regard to his history of an ischemic cardiomyopathy, I am concerned that he had a dyskinetic apex.  I will go ahead and increase his Coreg and continue his lisinopril.  I have asked him to obtain an echocardiogram.          Josué Mcneill MD  06/16/17  7:44 PM

## 2017-06-16 NOTE — PLAN OF CARE
Problem: Patient Care Overview (Adult)  Goal: Plan of Care Review  Outcome: Ongoing (interventions implemented as appropriate)    06/16/17 1038   Coping/Psychosocial Response Interventions   Plan Of Care Reviewed With patient   Patient Care Overview   Progress improving       Goal: Discharge Needs Assessment  Outcome: Ongoing (interventions implemented as appropriate)    06/16/17 1038   Discharge Needs Assessment   Discharge Disposition still a patient         Problem: Acute Coronary Syndrome (ACS) (Adult)  Goal: Signs and Symptoms of Listed Potential Problems Will be Absent or Manageable (Acute Coronary Syndrome)  Outcome: Ongoing (interventions implemented as appropriate)    06/16/17 1038   Acute Coronary Syndrome (ACS)   Problems Assessed (Acute Coronary Syndrome (ACS)) all   Problems Present (Acute Coronary Syndrome (ACS)) none         Problem: Pain, Acute (Adult)  Goal: Identify Related Risk Factors and Signs and Symptoms  Outcome: Ongoing (interventions implemented as appropriate)    06/16/17 1038   Pain, Acute   Related Risk Factors (Acute Pain) knowledge deficit;disease process       Goal: Acceptable Pain Control/Comfort Level  Outcome: Ongoing (interventions implemented as appropriate)    06/16/17 1038   Pain, Acute (Adult)   Acceptable Pain Control/Comfort Level making progress toward outcome

## 2017-06-17 VITALS
SYSTOLIC BLOOD PRESSURE: 105 MMHG | RESPIRATION RATE: 20 BRPM | DIASTOLIC BLOOD PRESSURE: 71 MMHG | HEIGHT: 72 IN | WEIGHT: 267.1 LBS | HEART RATE: 74 BPM | OXYGEN SATURATION: 96 % | BODY MASS INDEX: 36.18 KG/M2 | TEMPERATURE: 99.1 F

## 2017-06-17 LAB
ALBUMIN SERPL-MCNC: 3.4 G/DL (ref 3.4–4.8)
ALBUMIN/GLOB SERPL: 1.1 G/DL (ref 1.5–2.5)
ALP SERPL-CCNC: 72 U/L (ref 40–129)
ALT SERPL W P-5'-P-CCNC: 45 U/L (ref 10–44)
ANION GAP SERPL CALCULATED.3IONS-SCNC: 1.3 MMOL/L (ref 3.6–11.2)
AST SERPL-CCNC: 72 U/L (ref 10–34)
BH CV ECHO MEAS - % IVS THICK: 22.6 %
BH CV ECHO MEAS - % LVPW THICK: 0 %
BH CV ECHO MEAS - ACS: 1.6 CM
BH CV ECHO MEAS - AO ROOT AREA (BSA CORRECTED): 1.4
BH CV ECHO MEAS - AO ROOT AREA: 9.8 CM^2
BH CV ECHO MEAS - AO ROOT DIAM: 3.5 CM
BH CV ECHO MEAS - BSA(HAYCOCK): 2.8 M^2
BH CV ECHO MEAS - BSA: 2.6 M^2
BH CV ECHO MEAS - BZI_BMI: 43.4 KILOGRAMS/M^2
BH CV ECHO MEAS - BZI_METRIC_HEIGHT: 182.9 CM
BH CV ECHO MEAS - BZI_METRIC_WEIGHT: 145.2 KG
BH CV ECHO MEAS - CONTRAST EF 4CH: 67.5 ML/M^2
BH CV ECHO MEAS - EDV(CUBED): 194.9 ML
BH CV ECHO MEAS - EDV(MOD-SP4): 77 ML
BH CV ECHO MEAS - EDV(TEICH): 166.4 ML
BH CV ECHO MEAS - EF(CUBED): 65.1 %
BH CV ECHO MEAS - EF(MOD-SP4): 67.5 %
BH CV ECHO MEAS - EF(TEICH): 55.8 %
BH CV ECHO MEAS - ESV(CUBED): 68.1 ML
BH CV ECHO MEAS - ESV(MOD-SP4): 25 ML
BH CV ECHO MEAS - ESV(TEICH): 73.5 ML
BH CV ECHO MEAS - FS: 29.6 %
BH CV ECHO MEAS - IVS/LVPW: 0.86
BH CV ECHO MEAS - IVSD: 1.6 CM
BH CV ECHO MEAS - IVSS: 1.9 CM
BH CV ECHO MEAS - LA DIMENSION: 3.2 CM
BH CV ECHO MEAS - LA/AO: 0.92
BH CV ECHO MEAS - LV DIASTOLIC VOL/BSA (35-75): 29.6 ML/M^2
BH CV ECHO MEAS - LV MASS(C)D: 481.2 GRAMS
BH CV ECHO MEAS - LV MASS(C)DI: 185 GRAMS/M^2
BH CV ECHO MEAS - LV MASS(C)S: 340.9 GRAMS
BH CV ECHO MEAS - LV MASS(C)SI: 131.1 GRAMS/M^2
BH CV ECHO MEAS - LV SYSTOLIC VOL/BSA (12-30): 9.6 ML/M^2
BH CV ECHO MEAS - LVIDD: 5.8 CM
BH CV ECHO MEAS - LVIDS: 4.1 CM
BH CV ECHO MEAS - LVLD AP4: 8.6 CM
BH CV ECHO MEAS - LVLS AP4: 8 CM
BH CV ECHO MEAS - LVOT AREA (M): 2.5 CM^2
BH CV ECHO MEAS - LVOT AREA: 2.5 CM^2
BH CV ECHO MEAS - LVOT DIAM: 1.8 CM
BH CV ECHO MEAS - LVPWD: 1.8 CM
BH CV ECHO MEAS - LVPWS: 1.8 CM
BH CV ECHO MEAS - MV A MAX VEL: 84.4 CM/SEC
BH CV ECHO MEAS - MV DEC SLOPE: 249.2 CM/SEC^2
BH CV ECHO MEAS - MV E MAX VEL: 52.8 CM/SEC
BH CV ECHO MEAS - MV E/A: 0.63
BH CV ECHO MEAS - MV P1/2T MAX VEL: 56.8 CM/SEC
BH CV ECHO MEAS - MV P1/2T: 66.7 MSEC
BH CV ECHO MEAS - MVA P1/2T LCG: 3.9 CM^2
BH CV ECHO MEAS - MVA(P1/2T): 3.3 CM^2
BH CV ECHO MEAS - RAP SYSTOLE: 10 MMHG
BH CV ECHO MEAS - RVDD: 1.8 CM
BH CV ECHO MEAS - RVSP: 33.1 MMHG
BH CV ECHO MEAS - SI(CUBED): 48.7 ML/M^2
BH CV ECHO MEAS - SI(MOD-SP4): 20 ML/M^2
BH CV ECHO MEAS - SI(TEICH): 35.7 ML/M^2
BH CV ECHO MEAS - SV(CUBED): 126.8 ML
BH CV ECHO MEAS - SV(MOD-SP4): 52 ML
BH CV ECHO MEAS - SV(TEICH): 92.9 ML
BH CV ECHO MEAS - TR MAX VEL: 240.3 CM/SEC
BILIRUB SERPL-MCNC: 0.7 MG/DL (ref 0.2–1.8)
BUN BLD-MCNC: 13 MG/DL (ref 7–21)
BUN/CREAT SERPL: 14.4 (ref 7–25)
CALCIUM SPEC-SCNC: 8.9 MG/DL (ref 7.7–10)
CHLORIDE SERPL-SCNC: 101 MMOL/L (ref 99–112)
CO2 SERPL-SCNC: 30.7 MMOL/L (ref 24.3–31.9)
CREAT BLD-MCNC: 0.9 MG/DL (ref 0.43–1.29)
GFR SERPL CREATININE-BSD FRML MDRD: 83 ML/MIN/1.73
GLOBULIN UR ELPH-MCNC: 3.1 GM/DL
GLUCOSE BLD-MCNC: 195 MG/DL (ref 70–110)
GLUCOSE BLDC GLUCOMTR-MCNC: 169 MG/DL (ref 70–130)
GLUCOSE BLDC GLUCOMTR-MCNC: 185 MG/DL (ref 70–130)
LV EF 2D ECHO EST: 40 %
OSMOLALITY SERPL CALC.SUM OF ELEC: 271.9 MOSM/KG (ref 273–305)
POTASSIUM BLD-SCNC: 3.8 MMOL/L (ref 3.5–5.3)
PROT SERPL-MCNC: 6.5 G/DL (ref 6–8)
SODIUM BLD-SCNC: 133 MMOL/L (ref 135–153)

## 2017-06-17 PROCEDURE — 63710000001 INSULIN ASPART PER 5 UNITS: Performed by: INTERNAL MEDICINE

## 2017-06-17 PROCEDURE — 99238 HOSP IP/OBS DSCHRG MGMT 30/<: CPT | Performed by: INTERNAL MEDICINE

## 2017-06-17 PROCEDURE — 82962 GLUCOSE BLOOD TEST: CPT

## 2017-06-17 PROCEDURE — 80053 COMPREHEN METABOLIC PANEL: CPT | Performed by: INTERNAL MEDICINE

## 2017-06-17 PROCEDURE — 25010000002 PERFLUTREN (DEFINITY) 8.476 MG IN SODIUM CHLORIDE 10 ML INJECTION: Performed by: INTERNAL MEDICINE

## 2017-06-17 RX ORDER — CARVEDILOL 6.25 MG/1
6.25 TABLET ORAL 2 TIMES DAILY
Qty: 60 TABLET | Refills: 5 | Status: SHIPPED | OUTPATIENT
Start: 2017-06-17 | End: 2017-08-07

## 2017-06-17 RX ORDER — CLOPIDOGREL BISULFATE 75 MG/1
75 TABLET ORAL DAILY
Qty: 30 TABLET | Refills: 5 | Status: SHIPPED | OUTPATIENT
Start: 2017-06-17 | End: 2017-08-07

## 2017-06-17 RX ORDER — LISINOPRIL 5 MG/1
5 TABLET ORAL DAILY
Qty: 30 TABLET | Refills: 5 | Status: SHIPPED | OUTPATIENT
Start: 2017-06-17 | End: 2017-08-07

## 2017-06-17 RX ORDER — ATORVASTATIN CALCIUM 40 MG/1
40 TABLET, FILM COATED ORAL NIGHTLY
Qty: 30 TABLET | Refills: 11 | Status: SHIPPED | OUTPATIENT
Start: 2017-06-17 | End: 2018-04-03 | Stop reason: SDUPTHER

## 2017-06-17 RX ORDER — ATORVASTATIN CALCIUM 40 MG/1
40 TABLET, FILM COATED ORAL NIGHTLY
Qty: 30 TABLET | Refills: 5 | Status: SHIPPED | OUTPATIENT
Start: 2017-06-17 | End: 2017-08-07

## 2017-06-17 RX ADMIN — INSULIN ASPART 2 UNITS: 100 INJECTION, SOLUTION INTRAVENOUS; SUBCUTANEOUS at 08:52

## 2017-06-17 RX ADMIN — ASPIRIN 325 MG: 325 TABLET, COATED ORAL at 08:52

## 2017-06-17 RX ADMIN — LISINOPRIL 5 MG: 2.5 TABLET ORAL at 08:51

## 2017-06-17 RX ADMIN — CARVEDILOL 6.25 MG: 6.25 TABLET, FILM COATED ORAL at 08:51

## 2017-06-17 RX ADMIN — INSULIN ASPART 2 UNITS: 100 INJECTION, SOLUTION INTRAVENOUS; SUBCUTANEOUS at 12:30

## 2017-06-17 RX ADMIN — CLOPIDOGREL BISULFATE 75 MG: 75 TABLET, FILM COATED ORAL at 08:51

## 2017-06-17 RX ADMIN — PERFLUTREN 3 ML: 6.52 INJECTION, SUSPENSION INTRAVENOUS at 10:47

## 2017-06-17 NOTE — SIGNIFICANT NOTE
Report called to Keyon VALADEZ on [address]. Pt moved to [address] by lead RN THANIA via wheelchair. No distress noted at time of transfer, pt denied chest pain.

## 2017-06-17 NOTE — PLAN OF CARE
Problem: Patient Care Overview (Adult)  Goal: Plan of Care Review  Outcome: Ongoing (interventions implemented as appropriate)  Goal: Adult Individualization and Mutuality  Outcome: Ongoing (interventions implemented as appropriate)  Goal: Discharge Needs Assessment  Outcome: Ongoing (interventions implemented as appropriate)    Problem: Acute Coronary Syndrome (ACS) (Adult)  Goal: Signs and Symptoms of Listed Potential Problems Will be Absent or Manageable (Acute Coronary Syndrome)  Outcome: Ongoing (interventions implemented as appropriate)    Problem: Pain, Acute (Adult)  Goal: Identify Related Risk Factors and Signs and Symptoms  Outcome: Ongoing (interventions implemented as appropriate)  Goal: Acceptable Pain Control/Comfort Level  Outcome: Ongoing (interventions implemented as appropriate)    Problem: Diabetes, Type 2 (Adult)  Goal: Signs and Symptoms of Listed Potential Problems Will be Absent or Manageable (Diabetes, Type 2)  Outcome: Ongoing (interventions implemented as appropriate)

## 2017-06-17 NOTE — DISCHARGE SUMMARY
Chief complaint:  Chest pain    History of present illness:  Mr. Roth is a pleasant 72-year-old gentleman who presented with an acute anterior wall myocardial infarction    Hospital course:  The patient was brought emergently to the cardiac catheterization laboratory and underwent drug-eluting stent implantation to his proximal LAD.  Ventriculography revealed an ejection fraction of approximately 40% with dyskinesis dated the anteroapical territory.  Follow-up echocardiography suggested slight improvement in this territory in terms of LV function.  The peak CK was less than 1500.  He remained stable and is being discharged home.    Discharge diagnoses:  Coronary artery disease  Status post anterior wall myocardial infarction  Ischemic cardiomyopathy  Hypertension  Hyperlipidemia  Diabetes mellitus    Procedures performed:  Coronary angiography with emergent angioplasty and stenting of the LAD    Discharge medications:  Aspirin 325 mg daily  Lipitor 40 mg daily  Coreg 6.25 mg twice a day  Plavix 75 mg daily  Lisinopril 5 mg daily  Flomax 0.4 mg daily    Discharge instructions:  The patient will take his medications as prescribed  The patient return to the emergency department when necessary recurrent chest pain  The patient will follow-up with me in approximately 2-4 weeks.  The patient will follow-up with Dr. Boogie per his instructions.

## 2017-07-05 ENCOUNTER — DOCUMENTATION (OUTPATIENT)
Dept: CARDIAC REHAB | Facility: HOSPITAL | Age: 72
End: 2017-07-05

## 2017-07-05 NOTE — PROGRESS NOTES
Patient was called on 6/21/17 and 6/26/17 and a message was left. I spoke with him today about the program and he stated he was not interested at this time.

## 2017-08-07 ENCOUNTER — OFFICE VISIT (OUTPATIENT)
Dept: CARDIOLOGY | Facility: CLINIC | Age: 72
End: 2017-08-07

## 2017-08-07 VITALS
HEIGHT: 71 IN | HEART RATE: 77 BPM | BODY MASS INDEX: 44.1 KG/M2 | OXYGEN SATURATION: 97 % | DIASTOLIC BLOOD PRESSURE: 86 MMHG | WEIGHT: 315 LBS | SYSTOLIC BLOOD PRESSURE: 144 MMHG

## 2017-08-07 DIAGNOSIS — I25.10 CORONARY ARTERY DISEASE INVOLVING NATIVE CORONARY ARTERY OF NATIVE HEART WITHOUT ANGINA PECTORIS: Primary | ICD-10-CM

## 2017-08-07 DIAGNOSIS — E66.01 OBESITY, CLASS III, BMI 40-49.9 (MORBID OBESITY) (HCC): ICD-10-CM

## 2017-08-07 DIAGNOSIS — I25.5 ISCHEMIC CARDIOMYOPATHY: ICD-10-CM

## 2017-08-07 DIAGNOSIS — I10 ESSENTIAL HYPERTENSION: ICD-10-CM

## 2017-08-07 DIAGNOSIS — E78.2 MIXED HYPERLIPIDEMIA: ICD-10-CM

## 2017-08-07 PROBLEM — E66.813 OBESITY, CLASS III, BMI 40-49.9 (MORBID OBESITY): Status: ACTIVE | Noted: 2017-08-07

## 2017-08-07 PROCEDURE — 99214 OFFICE O/P EST MOD 30 MIN: CPT | Performed by: INTERNAL MEDICINE

## 2017-08-07 RX ORDER — LISINOPRIL 10 MG/1
10 TABLET ORAL DAILY
Qty: 90 TABLET | Refills: 3 | Status: SHIPPED | OUTPATIENT
Start: 2017-08-07 | End: 2018-04-03 | Stop reason: SDUPTHER

## 2017-08-07 NOTE — PROGRESS NOTES
Subjective   Sofia Roth is a 72 y.o. male.     Chief Complaint   Patient presents with   • Follow-up       History of Present Illness     Mr. Roth is a pleasant 72-year-old gentleman who presents for follow-up of known coronary artery disease.  He presented in June 2017 with an acute anterior wall myocardial infarction.  He underwent drug-eluting stent implantation to the LAD which was uncomplicated.  He did not have other significant coronary artery disease.  His anterior wall was relatively hypokinetic post procedure however, his overall ejection fraction was estimated to be 40%.  He notes that since his procedure he is done quite well.  He has remained active and despite this he denies any angina or anginal-like symptoms.  He denies any congestive heart failure symptoms.  He has been compliant with medical therapy.  He notes that his home blood pressures typically include systolics in the 120 to 130 range with diastolics in the 80s.  He denies any palpitations, near syncope or syncopal symptoms.    The following portions of the patient's history were reviewed and updated as appropriate: allergies, current medications, past family history, past medical history, past social history, past surgical history and problem list.    Review of Systems   Constitutional: Negative for activity change, appetite change and fatigue.   HENT: Negative for congestion and tinnitus.    Eyes: Negative for visual disturbance.   Respiratory: Negative for cough, chest tightness and shortness of breath.    Cardiovascular: Negative for chest pain, palpitations and leg swelling.   Gastrointestinal: Negative for blood in stool, nausea and vomiting.   Endocrine: Negative for cold intolerance, heat intolerance and polyuria.   Genitourinary: Negative for dysuria.   Musculoskeletal: Negative for myalgias and neck pain.   Skin: Negative for rash.   Neurological: Negative for dizziness, syncope, weakness and light-headedness.    Hematological: Bruises/bleeds easily.   Psychiatric/Behavioral: Negative for confusion and sleep disturbance.       Objective   Physical Exam   Constitutional: He is oriented to person, place, and time. He appears well-developed and well-nourished. No distress.   HENT:   Head: Normocephalic and atraumatic.   Nose: Nose normal.   Mouth/Throat: Oropharynx is clear and moist.   Eyes: Conjunctivae and EOM are normal. Right eye exhibits no discharge. Left eye exhibits no discharge. No scleral icterus.   Neck: Normal range of motion. No hepatojugular reflux and no JVD present. Carotid bruit is not present. No tracheal deviation present.   Cardiovascular: Normal rate, regular rhythm, S1 normal, S2 normal and intact distal pulses.  PMI is not displaced.  Exam reveals no gallop, no S3, no S4 and no friction rub.    No murmur heard.  Pulmonary/Chest: Effort normal and breath sounds normal. No accessory muscle usage. No respiratory distress. He has no wheezes. He has no rales. He exhibits no tenderness.   Abdominal: Soft. Bowel sounds are normal. He exhibits no distension. There is no tenderness.   Musculoskeletal: Normal range of motion. He exhibits no edema or tenderness.       Vascular Status -  His exam exhibits no right foot edema. His exam exhibits no left foot edema.  Neurological: He is alert and oriented to person, place, and time. No cranial nerve deficit. Coordination normal.   Skin: Skin is warm and dry. He is not diaphoretic.   Nursing note and vitals reviewed.      Procedures    Assessment/Plan   Coronary Artery Disease.  I suspect that overall the patient's coronary artery disease is stable.  At this time I have not ordered any further cardiac testing.  I will work on risk factor modification as noted below.    Obesity.  I spent a long time discussing the patient's weight and relevance to their cardiac risk profile.  I encouraged them to work on weight loss through aerobic exercise and diet.  I have discussed  diet options to include weight watchers and the Mediterranean diet etc.  I encouraged them to achieve a BMI of less than 25.    Ischemic Cardiomyopathy  In regard to his history of nonischemic cardio myopathy, again his ejection fraction was not terribly reduced.  For now I will continue him on his current medications with the exception of increasing his lisinopril as his blood pressures remain borderline elevated.    Hypertension  In regard to his history of hypertension, as noted above, his blood pressures are borderline elevated.  I have asked him to go ahead and increase his lisinopril to 10 mg daily.  I've asked him to maintain a blood pressure diary.    Hyperlipidemia  In regard to his history of hyperlipidemia, I will continue him on his current medications as is.  In several months I will obtain a fasting lipid profile.    In short, it is been a pleasure to participate in Mr. Roth care, I look forward to seeing him again in 3 months.

## 2017-09-01 DIAGNOSIS — Z95.5 STENTED CORONARY ARTERY: Primary | ICD-10-CM

## 2017-09-05 ENCOUNTER — TREATMENT (OUTPATIENT)
Dept: CARDIAC REHAB | Facility: HOSPITAL | Age: 72
End: 2017-09-05

## 2017-09-05 DIAGNOSIS — Z95.5 STENTED CORONARY ARTERY: Primary | ICD-10-CM

## 2017-09-05 NOTE — PROGRESS NOTES
Name: Sofia Roth  :1945 Allergies:Review of patient's allergies indicates no known allergies.   MRN: [id number] 72 y.o. Physician: Raza Boogie MD   Primary Diagnosis:    Diagnosis Plan   1. Stented coronary artery      Event Date: 6/15/2017 Specialist: Josué Mcneill   Secondary Diagnosis: STEMI Risk Stratification:Moderate Risk Note Author: Marce Harrington RN     Cardiovascular History: Previous MI     EXERCISE AT HOME  no  na  N/A    EF: 40%      Source ECHO          Ambulatory Status:Independent  Ambulatory Fall Risk Assessed on Initial Visit: yes 6 Minute Walk Pre- Cardiac Rehab:  Distance:976ft      RPE:12  Max. HR: 79       SPO2:98    MET: 2.4               Resting BP: 116/78 LA, 128/80 RA    Peak BP: 118/70  Recovery BP: 116/72  Comments: patient tolerated 6mwt well      NUTRITION  Lipids:yes If yes, labs as follows;  Total: No components found for: CHOLESTEROL  HDL:   HDL Cholesterol   Date Value Ref Range Status   2017 31 (L) 60 - 100 mg/dL Final    Lipids continued:  LDL:No results found for: LDL  Triglyceride: No components found for: TRIGLYCERIDE   Weight Management:                 Weight: 307 lb  Height: 71 in                                 BMI: There is no height or weight on file to calculate BMI.  Waist Circumference: na  inches   Alcohol Use: none Diabetes:Yes,  Monitors BS at home- yes, Frequency: 3 times daily, Random BS: 167    Last HGBA1C with date if applicable:No components found for: A1C         SOCIAL HISTORY  Social History     Social History   • Marital status:      Spouse name: N/A   • Number of children: N/A   • Years of education: N/A     Social History Main Topics   • Smoking status: Former Smoker     Packs/day: 1.00     Years: 15.00     Types: Cigarettes, Pipe, Cigars   • Smokeless tobacco: Current User     Types: Chew      Comment: quit smoking, still chews   • Alcohol use No   • Drug use: No   • Sexual activity: Defer     Other Topics Concern   • None      Social History Narrative       Educational Level (choose one that applies) high school diploma/GED Learning Barriers:Ready to Learn    Family Support:yes    Living Arrangement: lives with their spouse    Risk Factors: Stress  Yes, Hyperlipidemia  Yes, Diabetes  Yes If Yes: Do you check blood glucose daily  Yes Today's glucose level 167 and Obesity  Yes     Tobacco Adjunct: N/A  Na        Comorbidities: Previous MI     PSYCHOSOCIAL  Clinical Depression: no    Stress: yes     Assess presence or absence of depression using a valid screening tool: yes      PHYSICAL ASSESSMENT  Influenza vaccine: no  Pneumococcal vaccine: no          Angina: no    Describe angina scale of 0 - 4: 0 = none    Today are you having incisional pain? N/A. If, Yes, Scale: na        Today are you having any other pain? Yes. If, Yes, Scale: 2    Chronic knee pain Diagnosed with Hypertension:yes    Heart Sounds: S1S2 no S3 S4     Lung Sounds: normal air entry, lungs clear to auscultation         Assessment: Pt tolerated 6MWT well, NAD noted, no complaints voiced, skin warm, pink and dry, resp within normal limits and even, denies chest discomfort, chest pressure ,SOA .  Monitor shows NSR , V/S WDL ,see Gurinder documentation for exercise data.    Pt educated on Cardiac Rehab Program,  warm up, stretching, use of RPE scale, application of heart monitor, home exercise and exercise guidelines cardiac rehab attendance , Diabetic guidelines for cardiac rehab.  Cleaning and use of equipment, s/s to report. Verbal teach back verified   Orthopedic Problems: Bilat Knees    Are you being hurt, hit, or frightened by anyone at home or in your life? no    Are you being neglected by a caregiver? N/A Shoulder flexibility/Range of motion: Average     Recommended arm activity: Any     Leg flexibility: Average   Chose one: Average    Recommended stretching: Standing    Assessment: See above assessment    Family attends IA: yes Time of arrival: 1000  Time of  departure: 1225 pm     Patient Goals: build strength and endurance

## 2017-09-05 NOTE — PROGRESS NOTES
Cardiac Rehab Initial Assessment              9/5/2017  10:21 AM  Marce Harrington RNCardiac Catheterization Operative Report    Sofia Roth  [id number]  9/5/2017  Raza Boogie MD    He was referred for cardiac catheterization to ***. ***. Indications for the procedure include: {cardiac indications:03569}.     Procedure Details  The risks, benefits, complications, treatment options, and expected outcomes were discussed with the patient. The patient and/or family concurred with the proposed plan, giving informed consent. Patient was brought to the cath lab after IV hydration was begun and oral premedication was given. He was further sedated with {sedation meds:80927}. He was prepped and draped in the usual manner. Using the modified Seldinger access technique, a *** Brazilian sheath was placed in the femoral artery, and a *** Brazilian sheath was placed in the femoral veins. Heparin was administered. A right and left heart catheterization was done. Angiograms were also done.    Interventions:  ***    After the procedure was completed, sedation was stopped and the sheaths and catheters were all removed. Hemostasis was achieved with manual pressure.    Findings:    Hemodynamics  ***   Left Main  ***   RCA  ***   LAD  ***   Circ  ***   SVG(s)  ***   GERI  ***    LV  ***       Interventions/Vessels  ***   Guides/Wires  ***   Devices  ***   Post % Stenosis  ***   Closure Device  ***   Complications  ***     See other note

## 2017-09-07 ENCOUNTER — TREATMENT (OUTPATIENT)
Dept: CARDIAC REHAB | Facility: HOSPITAL | Age: 72
End: 2017-09-07

## 2017-09-07 VITALS — SYSTOLIC BLOOD PRESSURE: 120 MMHG | DIASTOLIC BLOOD PRESSURE: 78 MMHG | HEART RATE: 68 BPM

## 2017-09-07 DIAGNOSIS — Z95.5 STENTED CORONARY ARTERY: Primary | ICD-10-CM

## 2017-09-07 NOTE — PROGRESS NOTES
Pt. Attended Phase III CardiacRehab. See flow sheet for details. Pt attended first day of exercise.  Educated on warm up, stretching, use of RPE scale, cleaning and use of equipment, application of cardiac monitor and s/s to report.  Teach back verified.

## 2017-09-08 ENCOUNTER — APPOINTMENT (OUTPATIENT)
Dept: CARDIAC REHAB | Facility: HOSPITAL | Age: 72
End: 2017-09-08

## 2017-09-11 ENCOUNTER — APPOINTMENT (OUTPATIENT)
Dept: CARDIAC REHAB | Facility: HOSPITAL | Age: 72
End: 2017-09-11

## 2017-09-12 ENCOUNTER — TREATMENT (OUTPATIENT)
Dept: CARDIAC REHAB | Facility: HOSPITAL | Age: 72
End: 2017-09-12

## 2017-09-12 DIAGNOSIS — Z95.5 STENTED CORONARY ARTERY: Primary | ICD-10-CM

## 2017-09-12 NOTE — PROGRESS NOTES
CARDIAC/PULMONARY REHAB NUTRITION EDUCATION/ASSESSMENT      72 y.o.         Height: 71  in    Weight: 307  lb     BMI: 42.8 IBW:  189      %IBW: 162      Adj IBW:               Time seen:  1200 PM   Diet Survey Score:   Cardiac Risk Factors: MI, s/p stent, DM , HTN, obesity Weight Assessment: Extremely Obese  Weight Change:  unchanged  Usual Weight: 300 lb  Desired Weight: 200 lb     Current Diet: Regular   Appetite: good   Factors limiting PO intake:  none  Taste/smell changes:  No Food records reviewed? Yes     Occupation:  retired  Job Activity Level:NA    Routine Exercise: mild     Who does the patient live with: wife  Who does the cooking at home:  wife  Spouse/significant other's name:  Marion  Spouse/significant other present for diet instruction today? Yes  Patient actively receiving lifestyle support from others at home? Yes       Pertinent Lab Values:   Total: No components found for: CHOLESTEROL  HDL:   HDL Cholesterol   Date Value Ref Range Status   06/16/2017 31 (L) 60 - 100 mg/dL Final     LDL:No results found for: LDL  Triglyceride: No components found for: TRIGLYCERIDE  Last HGBA1C with date if applicable:No components found for: A1C  Glucose:   Glucose   Date Value Ref Range Status   06/17/2017 195 (H) 70 - 110 mg/dL Final    Nutritional Supplements: none    Pertinent Nutrition-Related Medications:  none         Stated Problem Areas / Concerns: Heart healthy eating with diabetes mgt weight loss     Assessment / Recommendations: Consistent carbohydrate diet low fat, low salt   Motivation level toward diet compliance: moderate       Education:    Education Level:  HS +  Previous cardiac diet education prior to coming to Cardiac Rehab?  No  Instructed on:  Cardiac diet  Diabetic diet  Weight management  1500 mg/day Na restriction  Label reading for heart health  Advised against salt-substitute use  CHO counting  Written materials given:  yes         Goals:  Decrease use of sea salt, carbohydrate  counting and low fat for diabetes and heart health, increase fruits and vegetables to 6-8 servings/day continue to limit red meat (averages 2 /month) continue low saturated fat , portion control for weight management and diabetes management                 2:05 PM  9/12/2017  Sruthi Dupree, RD

## 2017-09-13 ENCOUNTER — APPOINTMENT (OUTPATIENT)
Dept: CARDIAC REHAB | Facility: HOSPITAL | Age: 72
End: 2017-09-13

## 2017-09-14 ENCOUNTER — APPOINTMENT (OUTPATIENT)
Dept: CARDIAC REHAB | Facility: HOSPITAL | Age: 72
End: 2017-09-14

## 2017-09-14 ENCOUNTER — TREATMENT (OUTPATIENT)
Dept: CARDIAC REHAB | Facility: HOSPITAL | Age: 72
End: 2017-09-14

## 2017-09-14 VITALS — SYSTOLIC BLOOD PRESSURE: 120 MMHG | HEART RATE: 74 BPM | DIASTOLIC BLOOD PRESSURE: 84 MMHG

## 2017-09-14 DIAGNOSIS — Z95.5 STENTED CORONARY ARTERY: Primary | ICD-10-CM

## 2017-09-15 ENCOUNTER — APPOINTMENT (OUTPATIENT)
Dept: CARDIAC REHAB | Facility: HOSPITAL | Age: 72
End: 2017-09-15

## 2017-09-18 ENCOUNTER — APPOINTMENT (OUTPATIENT)
Dept: CARDIAC REHAB | Facility: HOSPITAL | Age: 72
End: 2017-09-18

## 2017-09-19 ENCOUNTER — TREATMENT (OUTPATIENT)
Dept: CARDIAC REHAB | Facility: HOSPITAL | Age: 72
End: 2017-09-19

## 2017-09-19 VITALS — DIASTOLIC BLOOD PRESSURE: 88 MMHG | HEART RATE: 74 BPM | SYSTOLIC BLOOD PRESSURE: 136 MMHG | OXYGEN SATURATION: 98 %

## 2017-09-19 DIAGNOSIS — Z95.5 STENTED CORONARY ARTERY: Primary | ICD-10-CM

## 2017-09-20 ENCOUNTER — APPOINTMENT (OUTPATIENT)
Dept: CARDIAC REHAB | Facility: HOSPITAL | Age: 72
End: 2017-09-20

## 2017-09-21 ENCOUNTER — TREATMENT (OUTPATIENT)
Dept: CARDIAC REHAB | Facility: HOSPITAL | Age: 72
End: 2017-09-21

## 2017-09-21 ENCOUNTER — APPOINTMENT (OUTPATIENT)
Dept: CARDIAC REHAB | Facility: HOSPITAL | Age: 72
End: 2017-09-21

## 2017-09-21 VITALS — SYSTOLIC BLOOD PRESSURE: 130 MMHG | DIASTOLIC BLOOD PRESSURE: 78 MMHG | OXYGEN SATURATION: 98 % | HEART RATE: 78 BPM

## 2017-09-21 DIAGNOSIS — Z95.5 STENTED CORONARY ARTERY: Primary | ICD-10-CM

## 2017-09-22 ENCOUNTER — APPOINTMENT (OUTPATIENT)
Dept: CARDIAC REHAB | Facility: HOSPITAL | Age: 72
End: 2017-09-22

## 2017-09-25 ENCOUNTER — APPOINTMENT (OUTPATIENT)
Dept: CARDIAC REHAB | Facility: HOSPITAL | Age: 72
End: 2017-09-25

## 2017-09-26 ENCOUNTER — TREATMENT (OUTPATIENT)
Dept: CARDIAC REHAB | Facility: HOSPITAL | Age: 72
End: 2017-09-26

## 2017-09-26 VITALS — OXYGEN SATURATION: 98 % | HEART RATE: 76 BPM | DIASTOLIC BLOOD PRESSURE: 78 MMHG | SYSTOLIC BLOOD PRESSURE: 124 MMHG

## 2017-09-26 DIAGNOSIS — Z95.5 STENTED CORONARY ARTERY: Primary | ICD-10-CM

## 2017-09-27 ENCOUNTER — APPOINTMENT (OUTPATIENT)
Dept: CARDIAC REHAB | Facility: HOSPITAL | Age: 72
End: 2017-09-27

## 2017-09-28 ENCOUNTER — TREATMENT (OUTPATIENT)
Dept: CARDIAC REHAB | Facility: HOSPITAL | Age: 72
End: 2017-09-28

## 2017-09-28 ENCOUNTER — APPOINTMENT (OUTPATIENT)
Dept: CARDIAC REHAB | Facility: HOSPITAL | Age: 72
End: 2017-09-28

## 2017-09-28 VITALS — HEART RATE: 71 BPM | DIASTOLIC BLOOD PRESSURE: 84 MMHG | SYSTOLIC BLOOD PRESSURE: 126 MMHG

## 2017-09-28 DIAGNOSIS — Z95.5 STENTED CORONARY ARTERY: Primary | ICD-10-CM

## 2017-09-29 ENCOUNTER — APPOINTMENT (OUTPATIENT)
Dept: CARDIAC REHAB | Facility: HOSPITAL | Age: 72
End: 2017-09-29

## 2017-10-02 ENCOUNTER — APPOINTMENT (OUTPATIENT)
Dept: CARDIAC REHAB | Facility: HOSPITAL | Age: 72
End: 2017-10-02

## 2017-10-03 ENCOUNTER — TREATMENT (OUTPATIENT)
Dept: CARDIAC REHAB | Facility: HOSPITAL | Age: 72
End: 2017-10-03

## 2017-10-03 DIAGNOSIS — Z95.5 STENTED CORONARY ARTERY: Primary | ICD-10-CM

## 2017-10-04 ENCOUNTER — APPOINTMENT (OUTPATIENT)
Dept: CARDIAC REHAB | Facility: HOSPITAL | Age: 72
End: 2017-10-04

## 2017-10-05 ENCOUNTER — APPOINTMENT (OUTPATIENT)
Dept: CARDIAC REHAB | Facility: HOSPITAL | Age: 72
End: 2017-10-05

## 2017-10-05 ENCOUNTER — TREATMENT (OUTPATIENT)
Dept: CARDIAC REHAB | Facility: HOSPITAL | Age: 72
End: 2017-10-05

## 2017-10-05 VITALS — HEART RATE: 85 BPM | SYSTOLIC BLOOD PRESSURE: 128 MMHG | OXYGEN SATURATION: 100 % | DIASTOLIC BLOOD PRESSURE: 68 MMHG

## 2017-10-05 DIAGNOSIS — Z95.5 STENTED CORONARY ARTERY: Primary | ICD-10-CM

## 2017-10-06 ENCOUNTER — APPOINTMENT (OUTPATIENT)
Dept: CARDIAC REHAB | Facility: HOSPITAL | Age: 72
End: 2017-10-06

## 2017-10-09 ENCOUNTER — APPOINTMENT (OUTPATIENT)
Dept: CARDIAC REHAB | Facility: HOSPITAL | Age: 72
End: 2017-10-09

## 2017-10-10 ENCOUNTER — TREATMENT (OUTPATIENT)
Dept: CARDIAC REHAB | Facility: HOSPITAL | Age: 72
End: 2017-10-10

## 2017-10-10 VITALS — DIASTOLIC BLOOD PRESSURE: 76 MMHG | SYSTOLIC BLOOD PRESSURE: 130 MMHG | HEART RATE: 69 BPM

## 2017-10-10 DIAGNOSIS — Z95.5 STENTED CORONARY ARTERY: Primary | ICD-10-CM

## 2017-10-11 ENCOUNTER — APPOINTMENT (OUTPATIENT)
Dept: CARDIAC REHAB | Facility: HOSPITAL | Age: 72
End: 2017-10-11

## 2017-10-12 ENCOUNTER — TREATMENT (OUTPATIENT)
Dept: CARDIAC REHAB | Facility: HOSPITAL | Age: 72
End: 2017-10-12

## 2017-10-12 ENCOUNTER — APPOINTMENT (OUTPATIENT)
Dept: CARDIAC REHAB | Facility: HOSPITAL | Age: 72
End: 2017-10-12

## 2017-10-12 VITALS — SYSTOLIC BLOOD PRESSURE: 126 MMHG | DIASTOLIC BLOOD PRESSURE: 80 MMHG | HEART RATE: 87 BPM

## 2017-10-12 DIAGNOSIS — Z95.5 STENTED CORONARY ARTERY: Primary | ICD-10-CM

## 2017-10-12 PROCEDURE — 93798 PHYS/QHP OP CAR RHAB W/ECG: CPT

## 2017-10-13 ENCOUNTER — APPOINTMENT (OUTPATIENT)
Dept: CARDIAC REHAB | Facility: HOSPITAL | Age: 72
End: 2017-10-13

## 2017-10-16 ENCOUNTER — APPOINTMENT (OUTPATIENT)
Dept: CARDIAC REHAB | Facility: HOSPITAL | Age: 72
End: 2017-10-16

## 2017-10-17 ENCOUNTER — TREATMENT (OUTPATIENT)
Dept: CARDIAC REHAB | Facility: HOSPITAL | Age: 72
End: 2017-10-17

## 2017-10-17 VITALS — DIASTOLIC BLOOD PRESSURE: 80 MMHG | SYSTOLIC BLOOD PRESSURE: 122 MMHG | HEART RATE: 82 BPM

## 2017-10-17 DIAGNOSIS — Z95.5 STENTED CORONARY ARTERY: Primary | ICD-10-CM

## 2017-10-18 ENCOUNTER — APPOINTMENT (OUTPATIENT)
Dept: CARDIAC REHAB | Facility: HOSPITAL | Age: 72
End: 2017-10-18

## 2017-10-19 ENCOUNTER — TREATMENT (OUTPATIENT)
Dept: CARDIAC REHAB | Facility: HOSPITAL | Age: 72
End: 2017-10-19

## 2017-10-19 ENCOUNTER — APPOINTMENT (OUTPATIENT)
Dept: CARDIAC REHAB | Facility: HOSPITAL | Age: 72
End: 2017-10-19

## 2017-10-19 VITALS — DIASTOLIC BLOOD PRESSURE: 84 MMHG | HEART RATE: 75 BPM | SYSTOLIC BLOOD PRESSURE: 124 MMHG

## 2017-10-19 DIAGNOSIS — Z95.5 STENTED CORONARY ARTERY: Primary | ICD-10-CM

## 2017-10-20 ENCOUNTER — APPOINTMENT (OUTPATIENT)
Dept: CARDIAC REHAB | Facility: HOSPITAL | Age: 72
End: 2017-10-20

## 2017-10-23 ENCOUNTER — APPOINTMENT (OUTPATIENT)
Dept: CARDIAC REHAB | Facility: HOSPITAL | Age: 72
End: 2017-10-23

## 2017-10-24 ENCOUNTER — TREATMENT (OUTPATIENT)
Dept: CARDIAC REHAB | Facility: HOSPITAL | Age: 72
End: 2017-10-24

## 2017-10-24 VITALS — DIASTOLIC BLOOD PRESSURE: 80 MMHG | OXYGEN SATURATION: 98 % | SYSTOLIC BLOOD PRESSURE: 128 MMHG | HEART RATE: 86 BPM

## 2017-10-24 DIAGNOSIS — Z95.5 STENTED CORONARY ARTERY: Primary | ICD-10-CM

## 2017-10-25 ENCOUNTER — APPOINTMENT (OUTPATIENT)
Dept: CARDIAC REHAB | Facility: HOSPITAL | Age: 72
End: 2017-10-25

## 2017-10-26 ENCOUNTER — APPOINTMENT (OUTPATIENT)
Dept: CARDIAC REHAB | Facility: HOSPITAL | Age: 72
End: 2017-10-26

## 2017-10-26 ENCOUNTER — TREATMENT (OUTPATIENT)
Dept: CARDIAC REHAB | Facility: HOSPITAL | Age: 72
End: 2017-10-26

## 2017-10-26 VITALS — SYSTOLIC BLOOD PRESSURE: 120 MMHG | HEART RATE: 85 BPM | DIASTOLIC BLOOD PRESSURE: 80 MMHG

## 2017-10-26 DIAGNOSIS — Z95.5 STENTED CORONARY ARTERY: Primary | ICD-10-CM

## 2017-10-27 ENCOUNTER — APPOINTMENT (OUTPATIENT)
Dept: CARDIAC REHAB | Facility: HOSPITAL | Age: 72
End: 2017-10-27

## 2017-10-27 ENCOUNTER — OFFICE VISIT (OUTPATIENT)
Dept: CARDIOLOGY | Facility: CLINIC | Age: 72
End: 2017-10-27

## 2017-10-27 VITALS
HEART RATE: 73 BPM | BODY MASS INDEX: 41.45 KG/M2 | DIASTOLIC BLOOD PRESSURE: 80 MMHG | OXYGEN SATURATION: 96 % | WEIGHT: 306 LBS | HEIGHT: 72 IN | SYSTOLIC BLOOD PRESSURE: 125 MMHG

## 2017-10-27 DIAGNOSIS — E66.01 OBESITY, CLASS III, BMI 40-49.9 (MORBID OBESITY) (HCC): ICD-10-CM

## 2017-10-27 DIAGNOSIS — I25.5 ISCHEMIC CARDIOMYOPATHY: ICD-10-CM

## 2017-10-27 DIAGNOSIS — I25.10 CORONARY ARTERY DISEASE INVOLVING NATIVE CORONARY ARTERY OF NATIVE HEART WITHOUT ANGINA PECTORIS: Primary | ICD-10-CM

## 2017-10-27 DIAGNOSIS — I10 ESSENTIAL HYPERTENSION: ICD-10-CM

## 2017-10-27 DIAGNOSIS — E78.2 MIXED HYPERLIPIDEMIA: ICD-10-CM

## 2017-10-27 PROCEDURE — 99214 OFFICE O/P EST MOD 30 MIN: CPT | Performed by: INTERNAL MEDICINE

## 2017-10-27 NOTE — PROGRESS NOTES
Subjective   Sofia Roth is a 72 y.o. male.     Chief Complaint   Patient presents with   • Follow-up   • Coronary Artery Disease   • Cardiomyopathy   • Hyperlipidemia   • Hypertension       History of Present Illness     Mr. Roth is a pleasant 72-year-old gentleman who presents for follow-up of known coronary artery disease.  He presented in June 2017 with an acute anterior wall myocardial infarction.  He underwent drug-eluting stent implantation to the LAD which was uncomplicated.  He did not have other significant coronary artery disease.  His anterior wall was relatively hypokinetic post procedure however, his overall ejection fraction was estimated to be 40%.  He currently states that he is doing well.  He has been actively participating in cardiac rehabilitation.  He denies any angina or anginal-like symptoms.  He denies any congestive heart failure symptoms.  He has been actively trying to lose weight.  He has been compliant with medical therapy.  He did have a recent lipid panel obtained which demonstrated a total cholesterol of 125 with an HDL 35 and an LDL of 71.  His hemoglobin A1c was 6.6%.  He has Track of his blood pressures and notes his systolics to be 120 with diastolics in the 70 range.    The following portions of the patient's history were reviewed and updated as appropriate: allergies, current medications, past medical history, past social history, past surgical history and problem list.    Review of Systems   Constitutional: Negative for activity change, appetite change and fatigue.   HENT: Negative for congestion and tinnitus.    Eyes: Negative for visual disturbance.   Respiratory: Negative for cough, chest tightness and shortness of breath.    Cardiovascular: Negative for chest pain, palpitations and leg swelling.   Gastrointestinal: Negative for blood in stool, nausea and vomiting.   Endocrine: Negative for cold intolerance, heat intolerance and polyuria.   Genitourinary: Negative  for dysuria.   Musculoskeletal: Negative for myalgias and neck pain.   Skin: Negative for rash.   Neurological: Positive for weakness. Negative for dizziness, syncope and light-headedness.   Hematological: Bruises/bleeds easily.   Psychiatric/Behavioral: Negative for confusion and sleep disturbance.       Objective   Physical Exam   Constitutional: He is oriented to person, place, and time. He appears well-developed and well-nourished. No distress.   HENT:   Head: Normocephalic and atraumatic.   Nose: Nose normal.   Mouth/Throat: Oropharynx is clear and moist.   Eyes: Conjunctivae and EOM are normal. Right eye exhibits no discharge. Left eye exhibits no discharge. No scleral icterus.   Neck: Normal range of motion. No hepatojugular reflux and no JVD present. Carotid bruit is not present. No tracheal deviation present.   Cardiovascular: Normal rate, regular rhythm, S1 normal, S2 normal and intact distal pulses.  PMI is not displaced.  Exam reveals no gallop, no S3, no S4 and no friction rub.    No murmur heard.  Pulmonary/Chest: Effort normal and breath sounds normal. No accessory muscle usage. No respiratory distress. He has no wheezes. He has no rales. He exhibits no tenderness.   Abdominal: Soft. Bowel sounds are normal. He exhibits no distension. There is no tenderness.   Musculoskeletal: Normal range of motion. He exhibits no edema or tenderness.       Vascular Status -  His exam exhibits no right foot edema. His exam exhibits no left foot edema.  Neurological: He is alert and oriented to person, place, and time. No cranial nerve deficit. Coordination normal.   Skin: Skin is warm and dry. He is not diaphoretic.   Nursing note and vitals reviewed.      Procedures    Assessment/Plan     Coronary Artery Disease.  I suspect that overall the patient's coronary artery disease is stable.  At this time I have not ordered any further cardiac testing.  I will work on risk factor modification as noted below.     Obesity.   I spent a long time discussing the patient's weight and relevance to their cardiac risk profile.  I encouraged them to work on weight loss through aerobic exercise and diet.  I have discussed diet options to include weight watchers and the Mediterranean diet etc.  I encouraged them to achieve a BMI of less than 25.     Ischemic Cardiomyopathy  In regard to his history of nonischemic cardiomyopathy, again his ejection fraction was not terribly reduced.  Furthermore, as noted below, his blood pressure is relatively well controlled.  As such, I will continue his current medicines as is.     Hypertension  In regard to his history of hypertension, at his last visit his lisinopril was increased which has resulted in improved blood pressure control.  For now I will continue his medications as is.  I have asked them to maintain a blood pressure diary.     Hyperlipidemia  In regard to his history of hyperlipidemia, I will continue him on his current medications as is given his recent lipid panel.    In short, it is been a pleasure to participate in Mr. Hardin's care, I look forward to seeing him again in 6 months.

## 2017-10-28 PROBLEM — I21.3 STEMI (ST ELEVATION MYOCARDIAL INFARCTION) (HCC): Status: RESOLVED | Noted: 2017-06-15 | Resolved: 2017-10-28

## 2017-10-30 ENCOUNTER — APPOINTMENT (OUTPATIENT)
Dept: CARDIAC REHAB | Facility: HOSPITAL | Age: 72
End: 2017-10-30

## 2017-10-31 ENCOUNTER — TREATMENT (OUTPATIENT)
Dept: CARDIAC REHAB | Facility: HOSPITAL | Age: 72
End: 2017-10-31

## 2017-10-31 VITALS — SYSTOLIC BLOOD PRESSURE: 122 MMHG | DIASTOLIC BLOOD PRESSURE: 74 MMHG | HEART RATE: 81 BPM

## 2017-10-31 DIAGNOSIS — Z95.5 STENTED CORONARY ARTERY: Primary | ICD-10-CM

## 2017-11-01 ENCOUNTER — APPOINTMENT (OUTPATIENT)
Dept: CARDIAC REHAB | Facility: HOSPITAL | Age: 72
End: 2017-11-01

## 2017-11-02 ENCOUNTER — TREATMENT (OUTPATIENT)
Dept: CARDIAC REHAB | Facility: HOSPITAL | Age: 72
End: 2017-11-02

## 2017-11-02 ENCOUNTER — APPOINTMENT (OUTPATIENT)
Dept: CARDIAC REHAB | Facility: HOSPITAL | Age: 72
End: 2017-11-02

## 2017-11-02 VITALS — SYSTOLIC BLOOD PRESSURE: 104 MMHG | DIASTOLIC BLOOD PRESSURE: 68 MMHG | OXYGEN SATURATION: 98 % | HEART RATE: 64 BPM

## 2017-11-02 DIAGNOSIS — Z95.5 STENTED CORONARY ARTERY: Primary | ICD-10-CM

## 2017-11-03 ENCOUNTER — APPOINTMENT (OUTPATIENT)
Dept: CARDIAC REHAB | Facility: HOSPITAL | Age: 72
End: 2017-11-03

## 2017-11-06 ENCOUNTER — APPOINTMENT (OUTPATIENT)
Dept: CARDIAC REHAB | Facility: HOSPITAL | Age: 72
End: 2017-11-06

## 2017-11-07 ENCOUNTER — TREATMENT (OUTPATIENT)
Dept: CARDIAC REHAB | Facility: HOSPITAL | Age: 72
End: 2017-11-07

## 2017-11-07 VITALS — SYSTOLIC BLOOD PRESSURE: 122 MMHG | DIASTOLIC BLOOD PRESSURE: 80 MMHG | HEART RATE: 94 BPM | OXYGEN SATURATION: 97 %

## 2017-11-07 DIAGNOSIS — Z95.5 STENTED CORONARY ARTERY: Primary | ICD-10-CM

## 2017-11-07 NOTE — PATIENT INSTRUCTIONS
"What You Need to Know About Smokeless Tobacco Use  Tobacco use is one of the leading causes of cancer and other chronic health problems. Smokeless tobacco is tobacco that is put directly into the mouth instead of being smoked. It may also be called chewing tobacco or snuff. Smokeless tobacco is made from the leaves of tobacco plants and it comes in several forms:  · Loose, dry leaves, plugs, or twists.  · Moist pouches.  · Dissolving lozenges or strips.  Chewing, sucking, or holding the tobacco in your mouth causes your mouth to make more saliva. The saliva mixes with the tobacco to make \"tobacco juice\" that is swallowed or spit out.  HOW CAN SMOKELESS TOBACCO AFFECT ME?  Using smokeless tobacco:  · Increases your risk of developing cancer. Smokeless tobacco contains at least 28 different types of cancer-causing chemicals (carcinogens).  · Increases your chances of developing other long-term health problems, including high blood pressure, heart disease, stroke, and dental problems.  · Can make you become addicted. Nicotine is one of the chemicals in tobacco. When you chew tobacco, you absorb nicotine from the tobacco juice. This can make you feel more alert than usual.  · Can cause problems with pregnancy. Pregnant women who use smokeless tobacco are more likely to miscarry or deliver a baby too early (premature delivery).  · Can affect the appearance and health of your mouth. Using smokeless tobacco may cause bad breath, yellow-brown teeth, mouth sores, cracking and bleeding lips, gum recession, and lesions on the soft tissues of your mouth (leukoplakia).  WHAT ARE THE BENEFITS OF NOT USING SMOKELESS TOBACCO?  The benefits of not using smokeless tobacco include:  · A healthy mind because:    You avoid addiction.  · A healthy body because:    You avoid dental problems.    You promote healthy pregnancy.    You avoid long-term health problems.  · A healthy wallet because:    You avoid costs of buying tobacco.    You " avoid health care costs in the future.  · A healthy family because:    You avoid accidental poisoning of children in your household.  WHAT CAN HAPPEN IF I CONTINUE TO USE SMOKELESS TOBACCO?  If you continue to use smokeless tobacco, you will increase your risk for developing certain cancers. These include:  · Tongue.  · Lips, mouth, and gums.  · Throat (esophagus) and voice box (larynx).  · Stomach.  · Pancreas.  · Bladder.  · Colon.  Long-term use of smokeless tobacco can also lead to:  · High blood pressure, heart disease, and stroke.  · Gum disease, gum recession, and bone loss around the teeth.  · Tooth decay.  HOW DO I QUIT USING SMOKELESS TOBACCO?  Quitting the use of smokeless tobacco can be hard, but it can be done. Follow these steps:  · Pick a date to quit. Set a date within the next two weeks. This gives you time to prepare.  · Write down the reasons why you are quitting. Keep this list in places where you will see it often, such as on your bathroom mirror or in your car or wallet.  · Identify the people, places, things, and activities that make you want to use tobacco (triggers) and avoid them.  · Get rid of any tobacco you have and remove any tobacco smells. To do this:    Throw away all containers of tobacco at home, at work, and in your car.    Throw away any other items that you use regularly when you chew tobacco.    Clean your car and make sure to remove all tobacco-related items.    Clean your home, including curtains and carpets.  · Tell your family, friends, and coworkers that you are quitting. This can make quitting easier.  · Ask your health care provider for help quitting smokeless tobacco. This may involve treatment. Find out what treatment options are covered by your health insurance.  · Keep track of how many days have passed since you quit. Remembering how long and hard you have worked to quit can help you avoid using tobacco again.  WHERE CAN I GET SUPPORT?  Ask your health care provider  if there is a local support group for quitting smokeless tobacco.  WHERE CAN I GET MORE INFORMATION?  You can learn more about the risks of using smokeless tobacco and the benefits of quitting from these sources:  · National [employer] [place]: www.cancer.gov  · American Cancer Society: www.cancer.org  WHEN SHOULD I SEEK MEDICAL CARE?  Seek medical care if you have:  · White or other discolored patches in your mouth.  · Difficulty swallowing.  · A change in your voice.  · Unexplained weight loss.  · Stomach pain, nausea, or vomiting.  REMEMBER:  · Smokeless tobacco contains at least 28 different chemicals that are known to cause cancer (carcinogen).  · Nicotine is an addictive chemical in smokeless tobacco.  · When you quit using smokeless tobacco, you lower your risk of developing cancer.     This information is not intended to replace advice given to you by your health care provider. Make sure you discuss any questions you have with your health care provider.     Document Released: 05/21/2012 Document Revised: 04/10/2017 Document Reviewed: 07/29/2016  [employer] Interactive Patient Education ©2017 [employer]

## 2017-11-07 NOTE — PROGRESS NOTES
Pt. Attended Phase III CardiacRehab. See flow sheet for details. NAD noted, no c/o's voiced     Education done on smokeless tobacco use,  Patient stated he was going to use his smokeless tobacco because he enjoys it and has no intention of quitting at this time. see Epic. VTB

## 2017-11-08 ENCOUNTER — APPOINTMENT (OUTPATIENT)
Dept: CARDIAC REHAB | Facility: HOSPITAL | Age: 72
End: 2017-11-08

## 2017-11-09 ENCOUNTER — TREATMENT (OUTPATIENT)
Dept: CARDIAC REHAB | Facility: HOSPITAL | Age: 72
End: 2017-11-09

## 2017-11-09 VITALS — SYSTOLIC BLOOD PRESSURE: 140 MMHG | HEART RATE: 95 BPM | DIASTOLIC BLOOD PRESSURE: 80 MMHG

## 2017-11-09 DIAGNOSIS — Z95.5 STENTED CORONARY ARTERY: Primary | ICD-10-CM

## 2017-11-14 ENCOUNTER — TREATMENT (OUTPATIENT)
Dept: CARDIAC REHAB | Facility: HOSPITAL | Age: 72
End: 2017-11-14

## 2017-11-14 VITALS — DIASTOLIC BLOOD PRESSURE: 70 MMHG | HEART RATE: 87 BPM | SYSTOLIC BLOOD PRESSURE: 140 MMHG

## 2017-11-14 DIAGNOSIS — Z95.5 STENTED CORONARY ARTERY: Primary | ICD-10-CM

## 2017-11-16 ENCOUNTER — TREATMENT (OUTPATIENT)
Dept: CARDIAC REHAB | Facility: HOSPITAL | Age: 72
End: 2017-11-16

## 2017-11-16 VITALS — OXYGEN SATURATION: 100 % | HEART RATE: 84 BPM | SYSTOLIC BLOOD PRESSURE: 130 MMHG | DIASTOLIC BLOOD PRESSURE: 80 MMHG

## 2017-11-16 DIAGNOSIS — Z95.5 STENTED CORONARY ARTERY: Primary | ICD-10-CM

## 2017-11-16 NOTE — PROGRESS NOTES
Pt attended phase III session as scheduled.  Dr. Mcneill physician immediately available. NAD note, skin warm, pink and dry, denies chest pain, chest pressure , SOA, tolerated exercise well. V/S WIDL See exercise flow  for exercise data

## 2017-11-21 ENCOUNTER — TREATMENT (OUTPATIENT)
Dept: CARDIAC REHAB | Facility: HOSPITAL | Age: 72
End: 2017-11-21

## 2017-11-21 VITALS — SYSTOLIC BLOOD PRESSURE: 132 MMHG | HEART RATE: 97 BPM | DIASTOLIC BLOOD PRESSURE: 82 MMHG

## 2017-11-21 DIAGNOSIS — Z95.5 STENTED CORONARY ARTERY: Primary | ICD-10-CM

## 2017-11-21 NOTE — PROGRESS NOTES
Pt attended phase III session as scheduled.  NAD note, skin warm, pink and dry, denies chest pain, chest pressure , SOA, tolerated exercise well. V/S WIDL See exercise flow  for exercise data

## 2017-11-28 ENCOUNTER — TREATMENT (OUTPATIENT)
Dept: CARDIAC REHAB | Facility: HOSPITAL | Age: 72
End: 2017-11-28

## 2017-11-28 VITALS — DIASTOLIC BLOOD PRESSURE: 84 MMHG | SYSTOLIC BLOOD PRESSURE: 144 MMHG | HEART RATE: 86 BPM

## 2017-11-28 DIAGNOSIS — Z95.5 STENTED CORONARY ARTERY: Primary | ICD-10-CM

## 2017-11-30 ENCOUNTER — TREATMENT (OUTPATIENT)
Dept: CARDIAC REHAB | Facility: HOSPITAL | Age: 72
End: 2017-11-30

## 2017-11-30 VITALS — DIASTOLIC BLOOD PRESSURE: 86 MMHG | SYSTOLIC BLOOD PRESSURE: 138 MMHG | OXYGEN SATURATION: 98 % | HEART RATE: 74 BPM

## 2017-11-30 DIAGNOSIS — Z95.5 STENTED CORONARY ARTERY: Primary | ICD-10-CM

## 2017-12-05 ENCOUNTER — TREATMENT (OUTPATIENT)
Dept: CARDIAC REHAB | Facility: HOSPITAL | Age: 72
End: 2017-12-05

## 2017-12-05 VITALS — DIASTOLIC BLOOD PRESSURE: 74 MMHG | SYSTOLIC BLOOD PRESSURE: 122 MMHG | HEART RATE: 76 BPM

## 2017-12-05 DIAGNOSIS — Z95.5 STENTED CORONARY ARTERY: Primary | ICD-10-CM

## 2017-12-07 ENCOUNTER — TREATMENT (OUTPATIENT)
Dept: CARDIAC REHAB | Facility: HOSPITAL | Age: 72
End: 2017-12-07

## 2017-12-07 VITALS — HEART RATE: 79 BPM | OXYGEN SATURATION: 98 % | SYSTOLIC BLOOD PRESSURE: 130 MMHG | DIASTOLIC BLOOD PRESSURE: 80 MMHG

## 2017-12-07 DIAGNOSIS — Z95.5 STENTED CORONARY ARTERY: Primary | ICD-10-CM

## 2017-12-07 NOTE — PROGRESS NOTES
Pt attended phase III session as scheduled.  Dr. Monteiro physician immediately available. NAD note, skin warm, pink and dry, denies chest pain, chest pressure , SOA, tolerated exercise well. V/S WIDL See exercise flow  for exercise data

## 2017-12-12 ENCOUNTER — TREATMENT (OUTPATIENT)
Dept: CARDIAC REHAB | Facility: HOSPITAL | Age: 72
End: 2017-12-12

## 2017-12-12 VITALS — HEART RATE: 72 BPM | SYSTOLIC BLOOD PRESSURE: 146 MMHG | DIASTOLIC BLOOD PRESSURE: 72 MMHG

## 2017-12-12 DIAGNOSIS — Z95.5 STENTED CORONARY ARTERY: Primary | ICD-10-CM

## 2017-12-14 ENCOUNTER — TREATMENT (OUTPATIENT)
Dept: CARDIAC REHAB | Facility: HOSPITAL | Age: 72
End: 2017-12-14

## 2017-12-14 VITALS — HEART RATE: 76 BPM | OXYGEN SATURATION: 98 % | DIASTOLIC BLOOD PRESSURE: 72 MMHG | SYSTOLIC BLOOD PRESSURE: 128 MMHG

## 2017-12-14 DIAGNOSIS — Z95.5 STENTED CORONARY ARTERY: Primary | ICD-10-CM

## 2017-12-14 NOTE — PROGRESS NOTES
Pt attended phase III session as scheduled.  Dr. Kirby physician immediately available. NAD note, skin warm, pink and dry, denies chest pain, chest pressure , SOA, tolerated exercise well. V/S WIDL See exercise flow  for exercise data

## 2017-12-19 ENCOUNTER — TREATMENT (OUTPATIENT)
Dept: CARDIAC REHAB | Facility: HOSPITAL | Age: 72
End: 2017-12-19

## 2017-12-19 VITALS — SYSTOLIC BLOOD PRESSURE: 128 MMHG | HEART RATE: 76 BPM | DIASTOLIC BLOOD PRESSURE: 84 MMHG

## 2017-12-19 DIAGNOSIS — Z95.5 STENTED CORONARY ARTERY: Primary | ICD-10-CM

## 2017-12-21 ENCOUNTER — TREATMENT (OUTPATIENT)
Dept: CARDIAC REHAB | Facility: HOSPITAL | Age: 72
End: 2017-12-21

## 2017-12-21 VITALS — DIASTOLIC BLOOD PRESSURE: 80 MMHG | SYSTOLIC BLOOD PRESSURE: 130 MMHG | HEART RATE: 76 BPM | OXYGEN SATURATION: 98 %

## 2017-12-21 DIAGNOSIS — Z95.5 STENTED CORONARY ARTERY: Primary | ICD-10-CM

## 2017-12-21 NOTE — PROGRESS NOTES
Pt attended phase III session as scheduled.  Dr. Zavala physician immediately available. NAD note, skin warm, pink and dry, denies chest pain, chest pressure , SOA, tolerated exercise well. V/S WIDL See exercise flow  for exercise data

## 2017-12-26 ENCOUNTER — TREATMENT (OUTPATIENT)
Dept: CARDIAC REHAB | Facility: HOSPITAL | Age: 72
End: 2017-12-26

## 2017-12-26 VITALS — SYSTOLIC BLOOD PRESSURE: 134 MMHG | DIASTOLIC BLOOD PRESSURE: 70 MMHG | HEART RATE: 94 BPM

## 2017-12-26 DIAGNOSIS — Z95.5 STENTED CORONARY ARTERY: Primary | ICD-10-CM

## 2017-12-26 NOTE — PROGRESS NOTES
Pt attended phase III session as scheduled.  Dr. Merida physician immediately available. NAD note, skin warm, pink and dry, denies chest pain, chest pressure , SOA, tolerated exercise well. V/S WIDL See exercise flow  for exercise data

## 2017-12-28 ENCOUNTER — TREATMENT (OUTPATIENT)
Dept: CARDIAC REHAB | Facility: HOSPITAL | Age: 72
End: 2017-12-28

## 2017-12-28 VITALS
SYSTOLIC BLOOD PRESSURE: 120 MMHG | HEART RATE: 80 BPM | OXYGEN SATURATION: 98 % | DIASTOLIC BLOOD PRESSURE: 70 MMHG | WEIGHT: 308 LBS | BODY MASS INDEX: 41.77 KG/M2

## 2017-12-28 DIAGNOSIS — Z95.5 STENTED CORONARY ARTERY: Primary | ICD-10-CM

## 2018-01-02 ENCOUNTER — APPOINTMENT (OUTPATIENT)
Dept: CARDIAC REHAB | Facility: HOSPITAL | Age: 73
End: 2018-01-02

## 2018-01-04 ENCOUNTER — APPOINTMENT (OUTPATIENT)
Dept: CARDIAC REHAB | Facility: HOSPITAL | Age: 73
End: 2018-01-04

## 2018-01-17 DIAGNOSIS — M25.561 PAIN IN BOTH KNEES, UNSPECIFIED CHRONICITY: Primary | ICD-10-CM

## 2018-01-17 DIAGNOSIS — M25.562 PAIN IN BOTH KNEES, UNSPECIFIED CHRONICITY: Primary | ICD-10-CM

## 2018-01-22 ENCOUNTER — OFFICE VISIT (OUTPATIENT)
Dept: ORTHOPEDIC SURGERY | Facility: CLINIC | Age: 73
End: 2018-01-22

## 2018-01-22 ENCOUNTER — HOSPITAL ENCOUNTER (OUTPATIENT)
Dept: GENERAL RADIOLOGY | Facility: HOSPITAL | Age: 73
Discharge: HOME | End: 2018-01-22
Attending: ORTHOPAEDIC SURGERY | Admitting: ORTHOPAEDIC SURGERY

## 2018-01-22 VITALS — BODY MASS INDEX: 41.45 KG/M2 | WEIGHT: 306 LBS | HEIGHT: 72 IN

## 2018-01-22 DIAGNOSIS — M25.561 PAIN IN BOTH KNEES, UNSPECIFIED CHRONICITY: ICD-10-CM

## 2018-01-22 DIAGNOSIS — M25.562 PAIN IN BOTH KNEES, UNSPECIFIED CHRONICITY: ICD-10-CM

## 2018-01-22 DIAGNOSIS — M17.0 ARTHRITIS OF BOTH KNEES: Primary | ICD-10-CM

## 2018-01-22 PROCEDURE — 99203 OFFICE O/P NEW LOW 30 MIN: CPT | Performed by: ORTHOPAEDIC SURGERY

## 2018-01-22 PROCEDURE — 73560 X-RAY EXAM OF KNEE 1 OR 2: CPT | Performed by: RADIOLOGY

## 2018-01-22 PROCEDURE — 73562 X-RAY EXAM OF KNEE 3: CPT

## 2018-01-22 RX ORDER — ATORVASTATIN CALCIUM 40 MG/1
TABLET, FILM COATED ORAL EVERY 24 HOURS
COMMUNITY
Start: 2017-07-06 | End: 2018-04-03 | Stop reason: SDUPTHER

## 2018-01-22 NOTE — PROGRESS NOTES
"Patient: Sofia Roth    YOB: 1945        History of Present Illness: Patient is a pleasant 72-year-old white male who presents for evaluation of his bilateral knee pain.  Denies any specific injury or trauma.  Has had a recent cardiac event I guess he was doing some cardiac rehabilitation and being on the bike was developing increasing pain in his knees.  States and he walks with a long [place] at times his knees hurt.  They may swell up a little bit.  Denies any paresthesias in his legs.  Says he gets some little bit of lower extremity swelling at times.  Does take Plavix and aspirin.  Non-insulin-dependent diabetic.  Former smoker.  Does have a positive family history for arthritis including family members with joint replacements  Has lost some weight since his heart attack  Past Medical History:   Diagnosis Date   • Arthritis    • Coronary artery disease    • Diabetes mellitus    • Essential hypertension 8/7/2017        Social History     Social History   • Marital status:      Spouse name: N/A   • Number of children: N/A   • Years of education: N/A     Occupational History   • Not on file.     Social History Main Topics   • Smoking status: Former Smoker     Packs/day: 1.00     Years: 15.00     Types: Cigarettes, Pipe, Cigars   • Smokeless tobacco: Current User     Types: Chew      Comment: quit smoking, still chews   • Alcohol use No   • Drug use: No   • Sexual activity: Defer     Other Topics Concern   • Not on file     Social History Narrative           Physical Exam: 72 y.o. male  General Appearance:    Alert and oriented x 3, cooperative, in no acute distress                   Vitals:    01/22/18 0918   Weight: (!) 139 kg (306 lb)   Height: 182.9 cm (72\")          Somewhat overweight white male no apparent acute distress.  His knees makes it show a trace effusion.  He has full extension of both knees flexion to about 105°.  There is no obvious instability of either knee.  There is no " erythema or bruising around the knees.  He has good strength with flexion-extension of his knees.  No obvious instability.  Has a trace amount of swelling in both lower extremities otherwise is grossly neurovascular intact      Radiology:       X-rays of both knees show quite severe osteoarthritis with almost complete loss of the medial joint space and patellofemoral changes noted      Assessment/Plan:    Significant bilateral knee also arthritis.  Talked about injection today patient does not want to do that.  Suggested some Tylenol arthritis.  Continued weight loss.  Gentle exercise program don't overdo it but keep active.  Have written a prescription for some Voltaren gel 1% has that should be safer for him from a GI standpoint to see if that helps.  I have discharged him from the office I told him his next step if his knees get bad enough would be to try intra-articular injection.  He'll give us a call back when he wants to do that      Discussion/Summary:        This chart was completed utilizing the dragon speech recognition software.  Grammatical errors, random word insertions, pronoun errors, and incomplete sentences or occasional consequences of the system due to software limitations, ambient noise, and hardware issues.  Any questions or concerns about the content, text, or information contained within the body of this dictation should be directly addressed to the physician for clarification        This document was signed by Elia Catalan M.D. January 22, 2018 9:28 AM

## 2018-01-23 ENCOUNTER — EPISODE CHANGES (OUTPATIENT)
Dept: CASE MANAGEMENT | Facility: OTHER | Age: 73
End: 2018-01-23

## 2018-04-03 ENCOUNTER — OFFICE VISIT (OUTPATIENT)
Dept: CARDIOLOGY | Facility: CLINIC | Age: 73
End: 2018-04-03

## 2018-04-03 VITALS
SYSTOLIC BLOOD PRESSURE: 127 MMHG | BODY MASS INDEX: 42.84 KG/M2 | HEIGHT: 71 IN | OXYGEN SATURATION: 97 % | HEART RATE: 76 BPM | WEIGHT: 306 LBS | DIASTOLIC BLOOD PRESSURE: 83 MMHG

## 2018-04-03 DIAGNOSIS — I10 ESSENTIAL HYPERTENSION: ICD-10-CM

## 2018-04-03 DIAGNOSIS — E78.2 MIXED HYPERLIPIDEMIA: ICD-10-CM

## 2018-04-03 DIAGNOSIS — I25.10 CORONARY ARTERY DISEASE INVOLVING NATIVE CORONARY ARTERY OF NATIVE HEART WITHOUT ANGINA PECTORIS: Primary | ICD-10-CM

## 2018-04-03 DIAGNOSIS — I25.5 ISCHEMIC CARDIOMYOPATHY: ICD-10-CM

## 2018-04-03 PROCEDURE — 99213 OFFICE O/P EST LOW 20 MIN: CPT | Performed by: NURSE PRACTITIONER

## 2018-04-03 RX ORDER — LISINOPRIL 10 MG/1
10 TABLET ORAL DAILY
Qty: 30 TABLET | Refills: 5 | Status: SHIPPED | OUTPATIENT
Start: 2018-04-03 | End: 2018-04-03 | Stop reason: SDUPTHER

## 2018-04-03 RX ORDER — ASPIRIN 81 MG/1
81 TABLET ORAL DAILY
COMMUNITY
End: 2018-04-03 | Stop reason: SDUPTHER

## 2018-04-03 RX ORDER — ASPIRIN 81 MG/1
81 TABLET ORAL DAILY
Qty: 30 TABLET | Refills: 11 | Status: SHIPPED | OUTPATIENT
Start: 2018-04-03

## 2018-04-03 RX ORDER — CLOPIDOGREL BISULFATE 75 MG/1
75 TABLET ORAL DAILY
Qty: 30 TABLET | Refills: 11 | Status: SHIPPED | OUTPATIENT
Start: 2018-04-03

## 2018-04-03 RX ORDER — CLOPIDOGREL BISULFATE 75 MG/1
75 TABLET ORAL DAILY
Qty: 30 TABLET | Refills: 5 | Status: SHIPPED | OUTPATIENT
Start: 2018-04-03 | End: 2018-04-03 | Stop reason: SDUPTHER

## 2018-04-03 RX ORDER — ATORVASTATIN CALCIUM 40 MG/1
40 TABLET, FILM COATED ORAL EVERY 24 HOURS
Qty: 30 TABLET | Refills: 5 | Status: SHIPPED | OUTPATIENT
Start: 2018-04-03 | End: 2018-04-03 | Stop reason: SDUPTHER

## 2018-04-03 RX ORDER — CARVEDILOL 6.25 MG/1
6.25 TABLET ORAL 2 TIMES DAILY WITH MEALS
Qty: 60 TABLET | Refills: 5 | Status: SHIPPED | OUTPATIENT
Start: 2018-04-03 | End: 2018-04-03 | Stop reason: SDUPTHER

## 2018-04-03 RX ORDER — CARVEDILOL 6.25 MG/1
6.25 TABLET ORAL 2 TIMES DAILY WITH MEALS
Qty: 60 TABLET | Refills: 11 | Status: SHIPPED | OUTPATIENT
Start: 2018-04-03 | End: 2019-04-27 | Stop reason: SDUPTHER

## 2018-04-03 RX ORDER — ATORVASTATIN CALCIUM 40 MG/1
40 TABLET, FILM COATED ORAL EVERY 24 HOURS
Qty: 30 TABLET | Refills: 11 | Status: SHIPPED | OUTPATIENT
Start: 2018-04-03 | End: 2019-06-29 | Stop reason: SDUPTHER

## 2018-04-03 RX ORDER — ASPIRIN 81 MG/1
81 TABLET ORAL DAILY
Qty: 30 TABLET | Refills: 5 | Status: SHIPPED | OUTPATIENT
Start: 2018-04-03 | End: 2018-04-03 | Stop reason: SDUPTHER

## 2018-04-03 RX ORDER — LISINOPRIL 10 MG/1
10 TABLET ORAL DAILY
Qty: 30 TABLET | Refills: 11 | Status: SHIPPED | OUTPATIENT
Start: 2018-04-03

## 2018-04-03 NOTE — PROGRESS NOTES
Subjective     Chief Complaint: Coronary Artery Disease; Hypertension; Hyperlipidemia; and Cardiomyopathy    History of Present Illness     Sofia Roth is a 72 y.o. male who presents for follow-up of known coronary artery disease.  He presented in June 2017 with an acute anterior wall myocardial infarction.  He underwent drug-eluting stent implantation to the LAD which was uncomplicated.  He did not have other significant coronary artery disease.  His anterior wall was relatively hypokinetic post procedure however, his overall ejection fraction was estimated to be 40%.    Mr Roth denies chest pain, palpitations, lower extremity swelling.  He denies cough, shortness of air, and chest tightness.  He reports a decrease in his overall activity level because of bilateral knee pain.  He refuses injections or knee replacement at this time.  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         Current Outpatient Prescriptions:   •  aspirin 81 MG EC tablet, Take 1 tablet by mouth Daily., Disp: 30 tablet, Rfl: 5  •  atorvastatin (LIPITOR) 40 MG tablet, Take 1 tablet by mouth Daily., Disp: 30 tablet, Rfl: 5  •  carvedilol (COREG) 6.25 MG tablet, Take 1 tablet by mouth 2 (Two) Times a Day With Meals., Disp: 60 tablet, Rfl: 5  •  clopidogrel (PLAVIX) 75 MG tablet, Take 1 tablet by mouth Daily., Disp: 30 tablet, Rfl: 5  •  lisinopril (PRINIVIL,ZESTRIL) 10 MG tablet, Take 1 tablet by mouth Daily., Disp: 30 tablet, Rfl: 5  •  metFORMIN (GLUCOPHAGE) 500 MG tablet, Take 500 mg by mouth 2 (Two) Times a Day With Meals., Disp: , Rfl:   •  tamsulosin (FLOMAX) 0.4 MG capsule 24 hr capsule, Take 1 capsule by mouth Every Night., Disp: , Rfl:      The following portions of the patient's history were reviewed and updated as appropriate: allergies, current medications, past family history, past medical history, past social history, past surgical history and problem list.    Review of  "Systems   Constitutional: Positive for activity change. Negative for appetite change and fatigue.   HENT: Negative for congestion and tinnitus.    Eyes: Negative for visual disturbance.   Respiratory: Negative for cough, chest tightness and shortness of breath.    Cardiovascular: Negative for chest pain, palpitations and leg swelling.   Gastrointestinal: Negative for blood in stool, nausea and vomiting.   Endocrine: Negative for cold intolerance, heat intolerance and polyuria.   Genitourinary: Negative for dysuria.   Musculoskeletal: Negative for myalgias and neck pain.   Skin: Negative for rash.   Neurological: Negative for dizziness, syncope, weakness and light-headedness.   Hematological: Does not bruise/bleed easily.   Psychiatric/Behavioral: Negative for confusion and sleep disturbance.       Objective     /83 (BP Location: Right arm)   Pulse 76   Ht 180.3 cm (71\")   Wt (!) 139 kg (306 lb)   SpO2 97%   BMI 42.68 kg/m²     Physical Exam   Constitutional: He appears well-developed and well-nourished.   HENT:   Head: Normocephalic and atraumatic.   Eyes: Pupils are equal, round, and reactive to light.   Neck: No JVD present.   Cardiovascular: Normal rate, regular rhythm and intact distal pulses.  Exam reveals no gallop and no friction rub.    No murmur heard.  Pulses:       Dorsalis pedis pulses are 2+ on the right side, and 2+ on the left side.        Posterior tibial pulses are 2+ on the right side, and 2+ on the left side.   No lower extremity swelling   Pulmonary/Chest: Effort normal and breath sounds normal. No respiratory distress. He has no wheezes. He has no rales.   Abdominal: Soft. He exhibits no mass. There is no tenderness. No hernia.   Skin: Skin is warm and dry.   Psychiatric: He has a normal mood and affect.   Vitals reviewed.      Procedures      Assessment/Plan     6/17/2017  Transthoracic Echocardiogram    Interpretation Summary     · Left ventricular systolic function is mildly " decreased. Estimated EF = 40%.  · The mid to distal LAD teritroy is akinetic  · There are no hemodynamically significant valvular lesions noted       6/15/2017  Cardiac Catheterization    Conclusion        · Right dominant coronary artery system with a patent stent in the proximal LAD     Final impression:  Right dominant coronary artery system without hemodynamically significant coronary artery disease and widely patent stent in the proximal LAD.     Procedures performed:  Coronary angiography     History of present illness:  Mr. Roth is a pleasant 72-year-old gentleman who resented to the hospital earlier today with an ST elevation myocardial infarction in the anterior territory.  He underwent drug-eluting stent implantation to the proximal LAD without complication.  He had recurrent chest discomfort postprocedure.  As such, he was brought back to the cardiac catheterization laboratory.  Prior to the procedure the patient was given informed consent apprising him of the risk of heart attack, stroke and death.  The patient understood the risks and agreed to proceed.     Procedure in detail:  The patient was brought to the cardiac catheterization laboratory and prepped and draped in the usual sterile fashion.  Adequate anesthesia was obtained by infiltrating the right groin with local lidocaine.  The prior sheath was exchanged over wire and a 5 Bengali JL4 catheter was used to engage the ostium of the left main coronary artery.  Angiography was performed in varying views using hand injection of contrast material.  At the conclusion of the procedure the patient was returned to the floor without evidence of complication.  Sheath was pulled using manual compression.     Angiographic findings in detail:  Left Main coronary artery:  Left main coronary artery is a large vessel which bifurcates into the LAD and circumflex arteries.  The left main coronary artery is free of atherosclerotic disease.     Left anterior  descending artery:  Left anterior descending artery is a large vessel which reaches beyond the apex the ventricle and gives rise to 2 moderate-sized diagonal branches.  There is a previously implanted stent in the proximal LAD which is widely patent without evidence of in-stent thrombosis or itch dissection.  There is GWENDOLYN-3 flow in the distal vascular bed.  The first diagonal branch is noted to have a 60% lesion in its ostium and its midsegment.     Circumflex artery:  The circumflex artery is a large vessel which gives rise to 3 obtuse marginal branches the first 2 of which are large and the third of which is small.  There is a 30% lesion in the midportion circumflex artery and a 30% lesion noted in the proximal portion of the first obtuse marginal branch.     Final impression and plan:  Overall it is my impression that Mr. Roth has undergone successful percutaneous revascularization as previously described.  His stent is widely patent without evidence of thrombosis.  He will undergo typical management post myocardial infarction.          Sofia was seen today for coronary artery disease, hypertension, hyperlipidemia and cardiomyopathy.    Diagnoses and all orders for this visit:    Coronary artery disease involving native coronary artery of native heart without angina pectoris     Stable   Continue GDMT:  ASA, Plavix, Coreg, lisinopril, and atorvastatin   Risk factor modification as indicated    Ischemic cardiomyopathy     Stable   Continue current medication.   Will repeat echo in 6 months    Essential hypertension     Stable   Continue current medications    Mixed hyperlipidemia     Continue current medications   Pt states PCP follows cholesterol and I have asked that he follow his PCP instructions.      Risk Factor Modification     I have discussed a heart healthy diet with the patient.  I have encouraged a diet high in fruits and vegetables as well as lean protein and whole grains.  I have indicated to the  patient that even a modest reduction of 3-5% in his/her weight can provide health benefits by decreasing blood sugar and triglycerides; a greater than 5% weight reduction can improve blood pressure, lipids and reduce the need for some medications.      Return to clinic in 6 months       DENICE Wu

## 2018-04-03 NOTE — PATIENT INSTRUCTIONS
MyPlate from [employer]  The general, healthful diet is based on the 2010 Dietary Guidelines for Americans. The amount of food you need to eat from each food group depends on your age, sex, and level of physical activity and can be individualized by a dietitian. Go to ChooseMyPlate.gov for more information.  What do I need to know about the MyPlate plan?  · Enjoy your food, but eat less.  · Avoid oversized portions.  ¨ ½ of your plate should include fruits and vegetables.  ¨ ¼ of your plate should be grains.  ¨ ¼ of your plate should be protein.  Grains   · Make at least half of your grains whole grains.  · For a 2,000 calorie daily food plan, eat 6 oz every day.  · 1 oz is about 1 slice bread, 1 cup cereal, or ½ cup cooked rice, cereal, or pasta.  Vegetables   · Make half your plate fruits and vegetables.  · For a 2,000 calorie daily food plan, eat 2½ cups every day.  · 1 cup is about 1 cup raw or cooked vegetables or vegetable juice or 2 cups raw leafy greens.  Fruits   · Make half your plate fruits and vegetables.  · For a 2,000 calorie daily food plan, eat 2 cups every day.  · 1 cup is about 1 cup fruit or 100% fruit juice or ½ cup dried fruit.  Protein   · For a 2,000 calorie daily food plan, eat 5½ oz every day.  · 1 oz is about 1 oz meat, poultry, or fish, ¼ cup cooked beans, 1 egg, 1 Tbsp peanut butter, or ½ oz nuts or seeds.  Dairy   · Switch to fat-free or low-fat (1%) milk.  · For a 2,000 calorie daily food plan, eat 3 cups every day.  · 1 cup is about 1 cup milk or yogurt or soy milk (soy beverage), 1½ oz natural cheese, or 2 oz processed cheese.  Fats, Oils, and Empty Calories   · Only small amounts of oils are recommended.  · Empty calories are calories from solid fats or added sugars.  · Compare sodium in foods like soup, bread, and frozen meals. Choose the foods with lower numbers.  · Drink water instead of sugary drinks.  What foods can I eat?  Grains   Whole grains such as whole wheat, quinoa,  millet, and bulgur. Bread, rolls, and pasta made from whole grains. Brown or wild rice. Hot or cold cereals made from whole grains and without added sugar.  Vegetables   All fresh vegetables, especially fresh red, dark green, or orange vegetables. Peas and beans. Low-sodium frozen or canned vegetables prepared without added salt. Low-sodium vegetable juices.  Fruits   All fresh, frozen, and dried fruits. Canned fruit packed in water or fruit juice without added sugar. Fruit juices without added sugar.  Meats and Other Protein Sources   Boiled, baked, or grilled lean meat trimmed of fat. Skinless poultry. Fresh seafood and shellfish. Canned seafood packed in water. Unsalted nuts and unsalted nut butters. Tofu. Dried beans and pea. Eggs.  Dairy   Low-fat or fat-free milk, yogurt, and cheeses.  Sweets and Desserts   Frozen desserts made from low-fat milk.  Fats and Oils   Olive, peanut, and canola oils and margarine. Salad dressing and mayonnaise made from these oils.  Other   Soups and casseroles made from allowed ingredients and without added fat or salt.  The items listed above may not be a complete list of recommended foods or beverages. Contact your dietitian for more options.   What foods are not recommended?  Grains   Sweetened, low-fiber cereals. Packaged baked goods. Snack crackers and chips. Cheese crackers, butter crackers, and biscuits. Frozen waffles, sweet breads, doughnuts, pastries, packaged baking mixes, pancakes, cakes, and cookies.  Vegetables   Regular canned or frozen vegetables or vegetables prepared with salt. Canned tomatoes. Canned tomato sauce. Fried vegetables. Vegetables in cream sauce or cheese sauce.  Fruits   Fruits packed in syrup or made with added sugar.  Meats and Other Protein Sources   Marbled or fatty meats such as ribs. Poultry with skin. Fried meats, poultry, eggs, or fish. Sausages, hot dogs, and deli meats such as pastrami, bologna, or salami.  Dairy   Whole milk, cream,  cheeses made from whole milk, sour cream. Ice cream or yogurt made from whole milk or with added sugar.  Beverages   For adults, no more than one alcoholic drink per day. Regular soft drinks or other sugary beverages. Juice drinks.  Sweets and Desserts   Sugary or fatty desserts, candy, and other sweets.  Fats and Oils   Solid shortening or partially hydrogenated oils. Solid margarine. Margarine that contains trans fats. Butter.  The items listed above may not be a complete list of foods and beverages to avoid. Contact your dietitian for more information.   This information is not intended to replace advice given to you by your health care provider. Make sure you discuss any questions you have with your health care provider.  Document Released: 01/06/2009 Document Revised: 05/25/2017 Document Reviewed: 11/26/2014  [employer] Interactive Patient Education © 2017 [employer]

## 2018-11-06 ENCOUNTER — OFFICE VISIT (OUTPATIENT)
Dept: CARDIOLOGY | Facility: CLINIC | Age: 73
End: 2018-11-06

## 2018-11-06 VITALS
OXYGEN SATURATION: 98 % | DIASTOLIC BLOOD PRESSURE: 85 MMHG | BODY MASS INDEX: 43.12 KG/M2 | HEIGHT: 71 IN | HEART RATE: 80 BPM | WEIGHT: 308 LBS | SYSTOLIC BLOOD PRESSURE: 154 MMHG

## 2018-11-06 DIAGNOSIS — I25.10 CORONARY ARTERY DISEASE INVOLVING NATIVE CORONARY ARTERY OF NATIVE HEART WITHOUT ANGINA PECTORIS: Primary | ICD-10-CM

## 2018-11-06 DIAGNOSIS — I25.5 ISCHEMIC CARDIOMYOPATHY: ICD-10-CM

## 2018-11-06 DIAGNOSIS — E78.2 MIXED HYPERLIPIDEMIA: ICD-10-CM

## 2018-11-06 DIAGNOSIS — I10 ESSENTIAL HYPERTENSION: ICD-10-CM

## 2018-11-06 PROCEDURE — 99213 OFFICE O/P EST LOW 20 MIN: CPT | Performed by: INTERNAL MEDICINE

## 2018-11-06 NOTE — PROGRESS NOTES
[facility] CARDIOLOGY  [address]  [place] [address]  Phone: [phone]  Fax: [phone]    11/06/2018    Chief Complaint: Routine followup of , CAD    History:   Sofia Roth is a 73 y.o. male seen in followup, No CP or SOB. At home BP is normal. Non smoker. Compliant with medications.            Chief Complaint: Coronary Artery Disease; Hypertension; Hyperlipidemia; and Cardiomyopathy     History of Present Illness      Sofia Roth is a 72 y.o. male who presents for follow-up of known coronary artery disease.  He presented in June 2017 with an acute anterior wall myocardial infarction.  He underwent drug-eluting stent implantation to the LAD which was uncomplicated.  He did not have other significant coronary artery disease.  His anterior wall was relatively hypokinetic post procedure however, his overall ejection fraction was estimated to be 40%.     Mr Roth denies chest pain, palpitations, lower extremity swelling.  He denies cough, shortness of air, and chest tightness.  He reports a decrease in his overall activity level because of bilateral knee pain.  He refuses injections or knee replacement at this time.  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     Past Medical History:   Diagnosis Date   • Arthritis    • Coronary artery disease    • Diabetes mellitus (CMS/[facility])    • Essential hypertension 8/7/2017       Past Social History:  Social History     Social History   • Marital status:      Social History Main Topics   • Smoking status: Former Smoker     Packs/day: 1.00     Years: 15.00     Types: Cigarettes, Pipe, Cigars   • Smokeless tobacco: Current User     Types: Chew      Comment: quit smoking, still chews   • Alcohol use No   • Drug use: No   • Sexual activity: Defer     Other Topics Concern   • Not on file       Past Family History:  Family History   Problem Relation Age of Onset   •  Cancer Mother    • Early death Mother    • Heart disease Father    • Diabetes Sister    • Osteoarthritis Sister    • Osteoporosis Sister    • Heart disease Sister    • Heart disease Paternal Uncle    • Diabetes Maternal Grandfather        Review of Systems:   Please see HPI  Constitution: No chills, no rigors, no unexplained weight loss or weight gain  Eyes:  No diplopia, no blurred vision, no loss of vision, conjunctiva is pink and sclera is anicteric  ENT:  No tinnitus, no otorrhea, no epistaxis, no sore throat   Respiratory: No cough, no hemoptysis  Cardiovascular: see HPI  Gastrointestinal: No nausea, no vomiting, no hematemesis, no diarrhea or constipation, no melena  Genitourinary: No frequency of dysuria no hematuria  Integument: No pruritis and  no skin rash  Hematologic / Lymphatic: No excessive bleeding, easy bruising, fatigue, lymphadenopathy and petechiae  Musculoskeletal: No joint pain, joint stiffness, joint swelling, muscle pain, muscle weakness and neck pain  Neurological: No dizziness, headaches, light headedness, seizures and vertigo  Endocrine: No frequent urination and nocturia, temperature intolerance, weight gain, unintended and weight loss, unintended      Current Outpatient Prescriptions on File Prior to Visit   Medication Sig Dispense Refill   • aspirin 81 MG EC tablet Take 1 tablet by mouth Daily. 30 tablet 11   • atorvastatin (LIPITOR) 40 MG tablet Take 1 tablet by mouth Daily. 30 tablet 11   • carvedilol (COREG) 6.25 MG tablet Take 1 tablet by mouth 2 (Two) Times a Day With Meals. 60 tablet 11   • clopidogrel (PLAVIX) 75 MG tablet Take 1 tablet by mouth Daily. 30 tablet 11   • lisinopril (PRINIVIL,ZESTRIL) 10 MG tablet Take 1 tablet by mouth Daily. 30 tablet 11   • metFORMIN (GLUCOPHAGE) 500 MG tablet Take 500 mg by mouth 2 (Two) Times a Day With Meals.     • tamsulosin (FLOMAX) 0.4 MG capsule 24 hr capsule Take 1 capsule by mouth Every Night.       No current facility-administered  medications on file prior to visit.        No Known Allergies    Objective:  Vitals:    11/06/18 1402   BP: 154/85   Pulse: 80   SpO2: 98%     Comfortable NAD  PERRL, conjunctiva clear  Neck supple, no JVD or thyromegaly appreciated  S1/S2 RRR, no m/r/g  Lungs CTA B, normal effort  Abdomen S/NT/ND (+) BS, no HSM appreciated  Extremities warm, no clubbing, cyanosis, or edema  Normal gait  No visible or palpable skin lesions  A/Ox4, mood and affect appropriate  Pulse exam: Hardik's:    DATA:       Results for orders placed during the hospital encounter of 06/15/17   Adult Transthoracic Echo Complete With Contrast    Narrative · Left ventricular systolic function is mildly decreased. Estimated EF =   40%.  · The mid to distal LAD teritroy is akinetic  · There are no hemodynamically significant valvular lesions noted                    Results for orders placed during the hospital encounter of 06/15/17   Cardiac Catheterization/Vascular Study    Narrative · Right dominant coronary artery system with a patent stent in the   proximal LAD     Final impression:  Right dominant coronary artery system without hemodynamically significant   coronary artery disease and widely patent stent in the proximal LAD.    Procedures performed:  Coronary angiography    History of present illness:  Mr. Roth is a pleasant 72-year-old gentleman who resented to the   hospital earlier today with an ST elevation myocardial infarction in the   anterior territory.  He underwent drug-eluting stent implantation to the   proximal LAD without complication.  He had recurrent chest discomfort   postprocedure.  As such, he was brought back to the cardiac   catheterization laboratory.  Prior to the procedure the patient was given   informed consent apprising him of the risk of heart attack, stroke and   death.  The patient understood the risks and agreed to proceed.    Procedure in detail:  The patient was brought to the cardiac catheterization  laboratory and   prepped and draped in the usual sterile fashion.  Adequate anesthesia was   obtained by infiltrating the right groin with local lidocaine.  The prior   sheath was exchanged over wire and a 5 [place] JL4 catheter was used to   engage the ostium of the left main coronary artery.  Angiography was   performed in varying views using hand injection of contrast material.  At   the conclusion of the procedure the patient was returned to the floor   without evidence of complication.  Sheath was pulled using manual   compression.    Angiographic findings in detail:  Left Main coronary artery:  Left main coronary artery is a large vessel which bifurcates into the LAD   and circumflex arteries.  The left main coronary artery is free of   atherosclerotic disease.    Left anterior descending artery:  Left anterior descending artery is a large vessel which reaches beyond the   apex the ventricle and gives rise to 2 moderate-sized diagonal branches.    There is a previously implanted stent in the proximal LAD which is widely   patent without evidence of in-stent thrombosis or itch dissection.  There   is GWENDOLYN-3 flow in the distal vascular bed.  The first diagonal branch is   noted to have a 60% lesion in its ostium and its midsegment.    Circumflex artery:  The circumflex artery is a large vessel which gives rise to 3 obtuse   marginal branches the first 2 of which are large and the third of which is   small.  There is a 30% lesion in the midportion circumflex artery and a   30% lesion noted in the proximal portion of the first obtuse marginal   branch.    Final impression and plan:  Overall it is my impression that Mr. Roth has undergone successful   percutaneous revascularization as previously described.  His stent is   widely patent without evidence of thrombosis.  He will undergo typical   management post myocardial infarction.           A/P:  CAD: stable. ASymptomatic. Cont GDMT. Recheck echo for LV  function.    F/u 6months. Consider DC plavix at next visit.     Thank you for allowing me to participate in the care of Sofia Roth. Feel free to contact me directly with any further questions or concerns.

## 2018-11-12 ENCOUNTER — HOSPITAL ENCOUNTER (OUTPATIENT)
Dept: CARDIOLOGY | Facility: HOSPITAL | Age: 73
Discharge: HOME | End: 2018-11-12
Attending: INTERNAL MEDICINE | Admitting: INTERNAL MEDICINE

## 2018-11-12 DIAGNOSIS — I25.10 CORONARY ARTERY DISEASE INVOLVING NATIVE CORONARY ARTERY OF NATIVE HEART WITHOUT ANGINA PECTORIS: ICD-10-CM

## 2018-11-12 LAB
BH CV ECHO MEAS - ACS: 2 CM
BH CV ECHO MEAS - AO ROOT AREA (BSA CORRECTED): 1.4
BH CV ECHO MEAS - AO ROOT AREA: 9.4 CM^2
BH CV ECHO MEAS - AO ROOT DIAM: 3.5 CM
BH CV ECHO MEAS - BSA(HAYCOCK): 2.7 M^2
BH CV ECHO MEAS - BSA: 2.5 M^2
BH CV ECHO MEAS - BZI_BMI: 43 KILOGRAMS/M^2
BH CV ECHO MEAS - BZI_METRIC_HEIGHT: 180.3 CM
BH CV ECHO MEAS - BZI_METRIC_WEIGHT: 139.7 KG
BH CV ECHO MEAS - EDV(MOD-SP4): 107 ML
BH CV ECHO MEAS - EF(MOD-SP4): 34.6 %
BH CV ECHO MEAS - ESV(MOD-SP4): 70 ML
BH CV ECHO MEAS - LA DIMENSION: 4.4 CM
BH CV ECHO MEAS - LA/AO: 1.3
BH CV ECHO MEAS - LV DIASTOLIC VOL/BSA (35-75): 42.2 ML/M^2
BH CV ECHO MEAS - LV SYSTOLIC VOL/BSA (12-30): 27.6 ML/M^2
BH CV ECHO MEAS - LVLD AP4: 8.9 CM
BH CV ECHO MEAS - LVLS AP4: 8.3 CM
BH CV ECHO MEAS - LVOT AREA (M): 4.9 CM^2
BH CV ECHO MEAS - LVOT AREA: 5 CM^2
BH CV ECHO MEAS - LVOT DIAM: 2.5 CM
BH CV ECHO MEAS - MV A MAX VEL: 87.9 CM/SEC
BH CV ECHO MEAS - MV E MAX VEL: 51.8 CM/SEC
BH CV ECHO MEAS - MV E/A: 0.59
BH CV ECHO MEAS - SI(MOD-SP4): 14.6 ML/M^2
BH CV ECHO MEAS - SV(MOD-SP4): 37 ML
MAXIMAL PREDICTED HEART RATE: 148 BPM
STRESS TARGET HR: 126 BPM

## 2018-11-12 PROCEDURE — 93306 TTE W/DOPPLER COMPLETE: CPT | Performed by: INTERNAL MEDICINE

## 2018-11-12 PROCEDURE — 93306 TTE W/DOPPLER COMPLETE: CPT

## 2018-11-13 ENCOUNTER — TELEPHONE (OUTPATIENT)
Dept: CARDIOLOGY | Facility: CLINIC | Age: 73
End: 2018-11-13

## 2018-11-13 NOTE — TELEPHONE ENCOUNTER
Called patient and let him know his echo results showed normal heart function. He was happy and thanked me.

## 2018-11-13 NOTE — TELEPHONE ENCOUNTER
----- Message from DENICE Sparks sent at 11/13/2018 11:16 AM EST -----  Please call patient.  Echocardiogram shows stable heart function.      Thanks, Magda

## 2018-12-31 ENCOUNTER — OFFICE VISIT (OUTPATIENT)
Dept: ORTHOPEDIC SURGERY | Facility: CLINIC | Age: 73
End: 2018-12-31

## 2018-12-31 VITALS — BODY MASS INDEX: 43.4 KG/M2 | WEIGHT: 310 LBS | HEIGHT: 71 IN

## 2018-12-31 DIAGNOSIS — M17.0 ARTHRITIS OF BOTH KNEES: Primary | ICD-10-CM

## 2018-12-31 PROCEDURE — 20610 DRAIN/INJ JOINT/BURSA W/O US: CPT | Performed by: ORTHOPAEDIC SURGERY

## 2018-12-31 RX ORDER — BUPIVACAINE HYDROCHLORIDE 5 MG/ML
5 INJECTION, SOLUTION EPIDURAL; INTRACAUDAL
Status: COMPLETED | OUTPATIENT
Start: 2018-12-31 | End: 2018-12-31

## 2018-12-31 RX ORDER — METHYLPREDNISOLONE ACETATE 40 MG/ML
80 INJECTION, SUSPENSION INTRA-ARTICULAR; INTRALESIONAL; INTRAMUSCULAR; SOFT TISSUE
Status: COMPLETED | OUTPATIENT
Start: 2018-12-31 | End: 2018-12-31

## 2018-12-31 RX ADMIN — METHYLPREDNISOLONE ACETATE 80 MG: 40 INJECTION, SUSPENSION INTRA-ARTICULAR; INTRALESIONAL; INTRAMUSCULAR; SOFT TISSUE at 08:54

## 2018-12-31 RX ADMIN — BUPIVACAINE HYDROCHLORIDE 5 ML: 5 INJECTION, SOLUTION EPIDURAL; INTRACAUDAL at 08:49

## 2018-12-31 RX ADMIN — BUPIVACAINE HYDROCHLORIDE 5 ML: 5 INJECTION, SOLUTION EPIDURAL; INTRACAUDAL at 08:54

## 2018-12-31 RX ADMIN — METHYLPREDNISOLONE ACETATE 80 MG: 40 INJECTION, SUSPENSION INTRA-ARTICULAR; INTRALESIONAL; INTRAMUSCULAR; SOFT TISSUE at 08:49

## 2018-12-31 NOTE — PROGRESS NOTES
"  Patient: Sofia Roth    YOB: 1945      Chief Complaint   Patient presents with   • Left Knee - Pain, Edema   • Right Knee - Pain, Edema         History of Present Illness: Patient presents back for his knees bilaterally.  No history of significant osteoarthritis.  Said the  voltaren gel sometimes seem to help but not always and only temporary.  Requesting injections today.  No history of either steroid injections or Visco supplementation in the past    Past Medical History:   Diagnosis Date   • Arthritis    • Coronary artery disease    • Diabetes mellitus (CMS/[facility])    • Essential hypertension 8/7/2017        Social History     Socioeconomic History   • Marital status:      Spouse name: Not on file   • Number of children: Not on file   • Years of education: Not on file   • Highest education level: Not on file   Social Needs   • Financial resource strain: Not on file   • Food insecurity - worry: Not on file   • Food insecurity - inability: Not on file   • Transportation needs - medical: Not on file   • Transportation needs - non-medical: Not on file   Occupational History   • Not on file   Tobacco Use   • Smoking status: Former Smoker     Packs/day: 1.00     Years: 15.00     Pack years: 15.00     Types: Cigarettes, Pipe, Cigars   • Smokeless tobacco: Current User     Types: Chew   • Tobacco comment: quit smoking, still chews   Substance and Sexual Activity   • Alcohol use: No   • Drug use: No   • Sexual activity: Defer   Other Topics Concern   • Not on file   Social History Narrative   • Not on file        Review of Systems:    Pertinent positives evaluated and listed in HPI, others systems completed by patient and reviewed today.         Physical Exam: 73 y.o. male  General Appearance:    Alert and oriented x 3, cooperative, in no acute distress                   Vitals:    12/31/18 0837   Weight: (!) 141 kg (310 lb)   Height: 180.3 cm (71\")          Essentially unchanged.  His full " extension and flexion to about 100° his knees bilaterally.      Radiology:           Large Joint Arthrocentesis: R knee  Date/Time: 12/31/2018 8:49 AM  Consent given by: patient  Site marked: site marked  Supporting Documentation  Indications: pain and diagnostic evaluation   Procedure Details  Location: knee - R knee  Needle size: 25 G  Approach: anterolateral  Medications administered: 80 mg methylPREDNISolone acetate 40 MG/ML; 5 mL bupivacaine (PF) 0.5 %  Patient tolerance: patient tolerated the procedure well with no immediate complications    Large Joint Arthrocentesis: L knee  Date/Time: 12/31/2018 8:54 AM  Consent given by: patient  Site marked: site marked  Supporting Documentation  Indications: pain and diagnostic evaluation   Procedure Details  Location: knee - L knee  Needle size: 25 G  Approach: anterolateral  Medications administered: 80 mg methylPREDNISolone acetate 40 MG/ML; 5 mL bupivacaine (PF) 0.5 %  Patient tolerance: patient tolerated the procedure well with no immediate complications                Assessment/Plan: Significant bilateral knee osteoarthritis.  Have injected him bilaterally with Depo-Medrol injections today.  Ice and relative rest for the next day or 2.  We'll see him back in 3 weeks to see how he responds.  If no long-acting respond we'll consider Visco supplementation                Discussion/Summary:    This chart was completed utilizing the dragon speech recognition software.  Grammatical errors, random word insertions, pronoun errors, and incomplete sentences or occasional consequences of the system due to software limitations, ambient noise, and hardware issues.  Any questions or concerns about the content, text, or information contained within the body of this dictation should be directly addressed to the physician for clarification          This document was signed by Elia Catalan M.D. December 31, 2018 8:48 AM

## 2019-01-28 ENCOUNTER — OFFICE VISIT (OUTPATIENT)
Dept: ORTHOPEDIC SURGERY | Facility: CLINIC | Age: 74
End: 2019-01-28

## 2019-01-28 VITALS — BODY MASS INDEX: 43.4 KG/M2 | WEIGHT: 310 LBS | HEIGHT: 71 IN

## 2019-01-28 DIAGNOSIS — M17.0 ARTHRITIS OF BOTH KNEES: Primary | ICD-10-CM

## 2019-01-28 PROCEDURE — 99213 OFFICE O/P EST LOW 20 MIN: CPT | Performed by: ORTHOPAEDIC SURGERY

## 2019-01-28 NOTE — PROGRESS NOTES
"Patient: Sofia Roth    YOB: 1945    Chief Complaint   Patient presents with   • Left Knee - Follow-up   • Right Knee - Follow-up         History of Present Illness: Presents back after bilateral knee injections.  States his knees are doing better.  He is able walk down heel now.  No complaints of paresthesias or swelling    Past Medical History:   Diagnosis Date   • Arthritis    • Coronary artery disease    • Diabetes mellitus (CMS/[facility])    • Essential hypertension 8/7/2017        Social History     Socioeconomic History   • Marital status:      Spouse name: Not on file   • Number of children: Not on file   • Years of education: Not on file   • Highest education level: Not on file   Social Needs   • Financial resource strain: Not on file   • Food insecurity - worry: Not on file   • Food insecurity - inability: Not on file   • Transportation needs - medical: Not on file   • Transportation needs - non-medical: Not on file   Occupational History   • Not on file   Tobacco Use   • Smoking status: Former Smoker     Packs/day: 1.00     Years: 15.00     Pack years: 15.00     Types: Cigarettes, Pipe, Cigars   • Smokeless tobacco: Current User     Types: Chew   • Tobacco comment: quit smoking, still chews   Substance and Sexual Activity   • Alcohol use: No   • Drug use: No   • Sexual activity: Defer   Other Topics Concern   • Not on file   Social History Narrative   • Not on file           Physical Exam: 73 y.o. male  General Appearance:    Alert and oriented x 3, cooperative, in no acute distress                   Vitals:    01/28/19 0848   Weight: (!) 141 kg (310 lb)   Height: 180.3 cm (71\")          Him of his knees bilaterally show no obvious effusions warmth or tenderness.  He has full extension and flexion to about 105°.      Radiology:             Assessment/Plan: Doing well status post the injections.  At this point I'll discharge him from the office.  If he has recurrent symptoms and give us " a call we can always repeat the injections.            Discussion/Summary:                This chart was completed utilizing the dragon speech recognition software.  Grammatical errors, random word insertions, pronoun errors, and incomplete sentences or occasional consequences of the system due to software limitations, ambient noise, and hardware issues.  Any questions or concerns about the content, text, or information contained within the body of this dictation should be directly addressed to the physician for clarification        This document was signed by Elia Catalan M.D. January 28, 2019 8:56 AM

## 2019-04-27 DIAGNOSIS — I10 ESSENTIAL HYPERTENSION: ICD-10-CM

## 2019-04-29 ENCOUNTER — OFFICE VISIT (OUTPATIENT)
Dept: ORTHOPEDIC SURGERY | Facility: CLINIC | Age: 74
End: 2019-04-29

## 2019-04-29 VITALS — HEIGHT: 71 IN | BODY MASS INDEX: 43.4 KG/M2 | WEIGHT: 310 LBS

## 2019-04-29 DIAGNOSIS — M17.0 ARTHRITIS OF BOTH KNEES: Primary | ICD-10-CM

## 2019-04-29 PROCEDURE — 20610 DRAIN/INJ JOINT/BURSA W/O US: CPT | Performed by: ORTHOPAEDIC SURGERY

## 2019-04-29 RX ORDER — CARVEDILOL 6.25 MG/1
TABLET ORAL
Qty: 60 TABLET | Refills: 1 | Status: SHIPPED | OUTPATIENT
Start: 2019-04-29 | End: 2019-06-29 | Stop reason: SDUPTHER

## 2019-04-29 RX ORDER — METHYLPREDNISOLONE ACETATE 40 MG/ML
80 INJECTION, SUSPENSION INTRA-ARTICULAR; INTRALESIONAL; INTRAMUSCULAR; SOFT TISSUE
Status: COMPLETED | OUTPATIENT
Start: 2019-04-29 | End: 2019-04-29

## 2019-04-29 RX ORDER — BUPIVACAINE HYDROCHLORIDE 5 MG/ML
5 INJECTION, SOLUTION EPIDURAL; INTRACAUDAL
Status: COMPLETED | OUTPATIENT
Start: 2019-04-29 | End: 2019-04-29

## 2019-04-29 RX ADMIN — METHYLPREDNISOLONE ACETATE 80 MG: 40 INJECTION, SUSPENSION INTRA-ARTICULAR; INTRALESIONAL; INTRAMUSCULAR; SOFT TISSUE at 09:37

## 2019-04-29 RX ADMIN — BUPIVACAINE HYDROCHLORIDE 5 ML: 5 INJECTION, SOLUTION EPIDURAL; INTRACAUDAL at 09:36

## 2019-04-29 RX ADMIN — BUPIVACAINE HYDROCHLORIDE 5 ML: 5 INJECTION, SOLUTION EPIDURAL; INTRACAUDAL at 09:37

## 2019-04-29 RX ADMIN — METHYLPREDNISOLONE ACETATE 80 MG: 40 INJECTION, SUSPENSION INTRA-ARTICULAR; INTRALESIONAL; INTRAMUSCULAR; SOFT TISSUE at 09:36

## 2019-04-29 NOTE — PROGRESS NOTES
"  Patient: Sofia Roth    YOB: 1945      Chief Complaint   Patient presents with   • Right Knee - Pain, Follow-up   • Left Knee - Follow-up, Pain         History of Present Illness: Patient presents back requesting bilateral knee injections.  Known history of severe arthritis.  The last injection we gave him in December worked very well for a while until he had a long 10-hour car ride and ever since then is been sore again    Past Medical History:   Diagnosis Date   • Arthritis    • Coronary artery disease    • Diabetes mellitus (CMS/[facility])    • Essential hypertension 8/7/2017        Social History     Socioeconomic History   • Marital status:      Spouse name: Not on file   • Number of children: Not on file   • Years of education: Not on file   • Highest education level: Not on file   Tobacco Use   • Smoking status: Former Smoker     Packs/day: 1.00     Years: 15.00     Pack years: 15.00     Types: Cigarettes, Pipe, Cigars   • Smokeless tobacco: Current User     Types: Chew   • Tobacco comment: quit smoking, still chews   Substance and Sexual Activity   • Alcohol use: No   • Drug use: No   • Sexual activity: Defer        Review of Systems:    Pertinent positives evaluated and listed in HPI, others systems completed by patient and reviewed today.         Physical Exam: 74 y.o. male  General Appearance:    Alert and oriented x 3, cooperative, in no acute distress                   Vitals:    04/29/19 0917   Weight: (!) 141 kg (310 lb)   Height: 180.3 cm (71\")            Essentially unchanged he is actually continues with full extension and flexion to about 100 degrees bilaterally    Radiology:           Large Joint Arthrocentesis: R knee  Date/Time: 4/29/2019 9:36 AM  Consent given by: patient  Site marked: site marked  Supporting Documentation  Indications: pain and diagnostic evaluation   Procedure Details  Location: knee - R knee  Needle size: 25 G  Approach: anterolateral  Medications " administered: 80 mg methylPREDNISolone acetate 40 MG/ML; 5 mL bupivacaine (PF) 0.5 %  Patient tolerance: patient tolerated the procedure well with no immediate complications    Large Joint Arthrocentesis: L knee  Date/Time: 4/29/2019 9:37 AM  Consent given by: patient  Site marked: site marked  Supporting Documentation  Indications: pain and diagnostic evaluation   Procedure Details  Location: knee - L knee  Needle size: 25 G  Approach: anterolateral  Medications administered: 80 mg methylPREDNISolone acetate 40 MG/ML; 5 mL bupivacaine (PF) 0.5 %  Patient tolerance: patient tolerated the procedure well with no immediate complications                Assessment/Plan: Bilateral knee osteoarthritis.  Reinjected him today with steroid and Marcaine.  If no improvement within 3 weeks call us back.  Otherwise we will see him back as needed.  Patient is not interested in total knee arthroplasty at this point as apparently his sister had a bad outcome with her knee replacement              Patient's Body mass index is 43.24 kg/m². BMI is above normal parameters. Recommendations include: referral to primary care.        Discussion/Summary:    This chart was completed utilizing the dragon speech recognition software.  Grammatical errors, random word insertions, pronoun errors, and incomplete sentences or occasional consequences of the system due to software limitations, ambient noise, and hardware issues.  Any questions or concerns about the content, text, or information contained within the body of this dictation should be directly addressed to the physician for clarification          This document was signed by Elia Catalan M.D. April 29, 2019 9:36 AM

## 2019-06-05 ENCOUNTER — OFFICE VISIT (OUTPATIENT)
Dept: CARDIOLOGY | Facility: CLINIC | Age: 74
End: 2019-06-05

## 2019-06-05 VITALS
SYSTOLIC BLOOD PRESSURE: 124 MMHG | DIASTOLIC BLOOD PRESSURE: 76 MMHG | HEIGHT: 71 IN | BODY MASS INDEX: 44.1 KG/M2 | OXYGEN SATURATION: 95 % | WEIGHT: 315 LBS | HEART RATE: 88 BPM

## 2019-06-05 DIAGNOSIS — E78.2 MIXED HYPERLIPIDEMIA: ICD-10-CM

## 2019-06-05 DIAGNOSIS — I10 ESSENTIAL HYPERTENSION: ICD-10-CM

## 2019-06-05 DIAGNOSIS — I25.10 CORONARY ARTERY DISEASE INVOLVING NATIVE CORONARY ARTERY OF NATIVE HEART WITHOUT ANGINA PECTORIS: Primary | ICD-10-CM

## 2019-06-05 DIAGNOSIS — I25.5 ISCHEMIC CARDIOMYOPATHY: ICD-10-CM

## 2019-06-05 PROCEDURE — 99214 OFFICE O/P EST MOD 30 MIN: CPT | Performed by: INTERNAL MEDICINE

## 2019-06-05 NOTE — PROGRESS NOTES
[facility] CARDIOLOGY  [address] [facility]  [place] [address]  Phone: [phone]  Fax: [phone]    06/05/2019    Chief Complaint   Patient presents with   • Coronary Artery Disease   • Cardiomyopathy   • Hypertension   • Hyperlipidemia        History:   Sofia Roth is a 74 y.o. male seen in follow up, CAD, DM, and essential HTN.  He denies any chest pain or shortness of breath. He presented in June 2017 with an acute anterior wall myocardial infarction.  He underwent drug-eluting stent implantation to the LAD in 2015. He did not have other significant coronary artery disease. His EF from last echo in November 2018 was 36-40%.       Past Medical History:   Diagnosis Date   • Arthritis    • Coronary artery disease    • Diabetes mellitus (CMS/HCC)    • Essential hypertension 8/7/2017       Past Surgical History:   Procedure Laterality Date   • BACK SURGERY     • CARDIAC CATHETERIZATION N/A 6/15/2017    Procedure: Left Heart Cath;  Surgeon: Josué Mcneill MD;  Location:  [facility] CATH INVASIVE LOCATION;  Service:    • CARDIAC CATHETERIZATION N/A 6/15/2017    Procedure: Left Heart Cath;  Surgeon: Josué Mcneill MD;  Location: [facility] CATH INVASIVE LOCATION;  Service:    • HERNIA REPAIR     • FL RT/LT HEART CATHETERS N/A 6/15/2017    Procedure: Percutaneous Coronary Intervention;  Surgeon: Josué Mcneill MD;  Location: [facility] CATH INVASIVE LOCATION;  Service: Cardiology   • TONSILLECTOMY     • VASECTOMY          Past Social History:  Social History     Socioeconomic History   • Marital status:      Spouse name: Not on file   • Number of children: Not on file   • Years of education: Not on file   • Highest education level: Not on file   Tobacco Use   • Smoking status: Former Smoker     Packs/day: 1.00     Years: 15.00     Pack years: 15.00     Types: Cigarettes, Pipe, Cigars   • Smokeless tobacco: Current User     Types: Chew   • Tobacco comment: quit smoking, still chews    Substance and Sexual Activity   • Alcohol use: No   • Drug use: No   • Sexual activity: Defer       Past Family History:  Family History   Problem Relation Age of Onset   • Cancer Mother    • Early death Mother    • Heart disease Father    • Diabetes Sister    • Osteoarthritis Sister    • Osteoporosis Sister    • Heart disease Sister    • Heart disease Paternal Uncle    • Diabetes Maternal Grandfather        Review of Systems:   Review of Systems   Constitution: Negative for diaphoresis, fever, weakness, weight gain and weight loss.   HENT: Negative for congestion, nosebleeds and sore throat.    Eyes: Negative for blurred vision and visual disturbance.   Cardiovascular: Negative for chest pain, claudication, dyspnea on exertion, irregular heartbeat, leg swelling, orthopnea, palpitations, paroxysmal nocturnal dyspnea and syncope.   Respiratory: Positive for snoring. Negative for cough, hemoptysis, shortness of breath, sleep disturbances due to breathing, sputum production and wheezing.    Endocrine: Negative for cold intolerance, heat intolerance, polydipsia, polyphagia and polyuria.   Hematologic/Lymphatic: Negative for bleeding problem.   Skin: Negative for dry skin, itching and rash.   Musculoskeletal: Negative for muscle cramps, muscle weakness and myalgias.   Gastrointestinal: Negative for abdominal pain, constipation, diarrhea, heartburn, hematemesis, hematochezia, hemorrhoids, melena, nausea and vomiting.   Genitourinary: Negative for hematuria and nocturia.   Neurological: Negative for excessive daytime sleepiness, dizziness, focal weakness, headaches, light-headedness, numbness, seizures and vertigo.   Psychiatric/Behavioral: Negative for depression, substance abuse and suicidal ideas.   Allergic/Immunologic: Negative for environmental allergies.         Current Outpatient Medications   Medication Sig Dispense Refill   • aspirin 81 MG EC tablet Take 1 tablet by mouth Daily. 30 tablet 11   • atorvastatin  "(LIPITOR) 40 MG tablet Take 1 tablet by mouth Daily. 30 tablet 11   • carvedilol (COREG) 6.25 MG tablet TAKE 1 TABLET BY MOUTH TWICE DAILY WITH FOOD 60 tablet 1   • CBD (cannabidiol) oral oil Take  by mouth.     • clopidogrel (PLAVIX) 75 MG tablet Take 1 tablet by mouth Daily. 30 tablet 11   • lisinopril (PRINIVIL,ZESTRIL) 10 MG tablet Take 1 tablet by mouth Daily. 30 tablet 11   • metFORMIN (GLUCOPHAGE) 500 MG tablet Take 500 mg by mouth 2 (Two) Times a Day With Meals.     • tamsulosin (FLOMAX) 0.4 MG capsule 24 hr capsule Take 1 capsule by mouth Every Night.       No current facility-administered medications for this visit.         No Known Allergies    Objective     /76 (BP Location: Left arm, Patient Position: Sitting)   Pulse 88   Ht 180.3 cm (71\")   Wt (!) 143 kg (315 lb)   SpO2 95%   BMI 43.93 kg/m²     Physical Exam   Constitutional: He is oriented to person, place, and time. He appears well-developed and well-nourished. No distress.   Eyes: Pupils are equal, round, and reactive to light.   Neck: Neck supple. No JVD present.   Cardiovascular: Normal rate and regular rhythm.   Pulmonary/Chest: Effort normal and breath sounds normal.   Abdominal: Soft. Bowel sounds are normal.   Musculoskeletal: He exhibits no edema.   Neurological: He is alert and oriented to person, place, and time.   Skin: Skin is warm and dry.   Psychiatric: He has a normal mood and affect.       DATA:      Results for orders placed during the hospital encounter of 11/12/18   Adult Transthoracic Echo Complete W/ Cont if Necessary Per Protocol    Narrative · Left ventricle not well visualized. Estimated EF appears to be in the   range of 36 - 40%. The following left ventricular wall segments are   dyskinetic: apex. The following left ventricular wall segments are   akinetic: apical lateral, apical septal and mid anteroseptal. Left   ventricular diastolic dysfunction is noted (grade I) consistent with   impaired relaxation.  · " Findings consistent with LAD distribution myocardial infarction.             Results for orders placed during the hospital encounter of 06/15/17   Cardiac Catheterization/Vascular Study    Narrative · Right dominant coronary artery system with a patent stent in the   proximal LAD     Final impression:  Right dominant coronary artery system without hemodynamically significant   coronary artery disease and widely patent stent in the proximal LAD.    Procedures performed:  Coronary angiography    History of present illness:  Mr. Roth is a pleasant 72-year-old gentleman who resented to the   hospital earlier today with an ST elevation myocardial infarction in the   anterior territory.  He underwent drug-eluting stent implantation to the   proximal LAD without complication.  He had recurrent chest discomfort   postprocedure.  As such, he was brought back to the cardiac   catheterization laboratory.  Prior to the procedure the patient was given   informed consent apprising him of the risk of heart attack, stroke and   death.  The patient understood the risks and agreed to proceed.    Procedure in detail:  The patient was brought to the cardiac catheterization laboratory and   prepped and draped in the usual sterile fashion.  Adequate anesthesia was   obtained by infiltrating the right groin with local lidocaine.  The prior   sheath was exchanged over wire and a 5 Latv[place] JL4 catheter was used to   engage the ostium of the left main coronary artery.  Angiography was   performed in varying views using hand injection of contrast material.  At   the conclusion of the procedure the patient was returned to the floor   without evidence of complication.  Sheath was pulled using manual   compression.    Angiographic findings in detail:  Left Main coronary artery:  Left main coronary artery is a large vessel which bifurcates into the LAD   and circumflex arteries.  The left main coronary artery is free of   atherosclerotic  disease.    Left anterior descending artery:  Left anterior descending artery is a large vessel which reaches beyond the   apex the ventricle and gives rise to 2 moderate-sized diagonal branches.    There is a previously implanted stent in the proximal LAD which is widely   patent without evidence of in-stent thrombosis or itch dissection.  There   is GWENDOLYN-3 flow in the distal vascular bed.  The first diagonal branch is   noted to have a 60% lesion in its ostium and its midsegment.    Circumflex artery:  The circumflex artery is a large vessel which gives rise to 3 obtuse   marginal branches the first 2 of which are large and the third of which is   small.  There is a 30% lesion in the midportion circumflex artery and a   30% lesion noted in the proximal portion of the first obtuse marginal   branch.    Final impression and plan:  Overall it is my impression that Mr. Roth has undergone successful   percutaneous revascularization as previously described.  His stent is   widely patent without evidence of thrombosis.  He will undergo typical   management post myocardial infarction.       Procedures             Assessment:  1. Dyslipidemia   2. Hypertension is stable today.    3. Ischemic cardiomyopathy  4. Obesity  5. Diabetes Type II controlled  6. CAD s/p LAD PCI       Plan:  It has been 12 months since his last stent therefore we can now DC Plavix,  Lipids are abnormal Recommend that we could add Zetia to better control his dyslipidemia,  He would prefer diet and exercise at this time. His blood pressure is doing well,  Discussed with him that we would recheck an echo before next visit to assess his EF.    Follow up 6 months with an echo 1 week prior to appointment      Recommended increase activity to 30 minutes of walking daily, most days of the week.  Discussed diet and weight loss with patient.        Patient's Body mass index is 43.93 kg/m². BMI is above normal parameters. Recommendations include:  educational material.       No Follow-up on file.    Thank you for allowing me to participate in the care of Sofia Roth. Feel free to contact me directly with any further questions or concerns.          Phani Acevedo MD, FACC  Interventional Cardiology    ADDIE Ferreira, acting as scribe for Phani Acevedo MD, FACC, [facility].     06/05/19  1:36 PM

## 2019-06-17 ENCOUNTER — HOSPITAL ENCOUNTER (OUTPATIENT)
Dept: CARDIOLOGY | Facility: HOSPITAL | Age: 74
Discharge: HOME | End: 2019-06-17
Admitting: INTERNAL MEDICINE

## 2019-06-17 DIAGNOSIS — I25.5 ISCHEMIC CARDIOMYOPATHY: ICD-10-CM

## 2019-06-17 DIAGNOSIS — I10 ESSENTIAL HYPERTENSION: ICD-10-CM

## 2019-06-17 DIAGNOSIS — I25.10 CORONARY ARTERY DISEASE INVOLVING NATIVE CORONARY ARTERY OF NATIVE HEART WITHOUT ANGINA PECTORIS: ICD-10-CM

## 2019-06-17 PROCEDURE — 93306 TTE W/DOPPLER COMPLETE: CPT | Performed by: INTERNAL MEDICINE

## 2019-06-17 PROCEDURE — 93306 TTE W/DOPPLER COMPLETE: CPT

## 2019-06-20 LAB
BH CV ECHO MEAS - % IVS THICK: -12.7 %
BH CV ECHO MEAS - % LVPW THICK: 27.1 %
BH CV ECHO MEAS - ACS: 2 CM
BH CV ECHO MEAS - AO MAX PG: 8.9 MMHG
BH CV ECHO MEAS - AO MEAN PG: 3.5 MMHG
BH CV ECHO MEAS - AO ROOT AREA (BSA CORRECTED): 1.5
BH CV ECHO MEAS - AO ROOT AREA: 11.8 CM^2
BH CV ECHO MEAS - AO ROOT DIAM: 3.9 CM
BH CV ECHO MEAS - AO V2 MAX: 149.3 CM/SEC
BH CV ECHO MEAS - AO V2 MEAN: 84.1 CM/SEC
BH CV ECHO MEAS - AO V2 VTI: 30.8 CM
BH CV ECHO MEAS - BSA(HAYCOCK): 2.7 M^2
BH CV ECHO MEAS - BSA: 2.6 M^2
BH CV ECHO MEAS - BZI_BMI: 43.9 KILOGRAMS/M^2
BH CV ECHO MEAS - BZI_METRIC_HEIGHT: 180.3 CM
BH CV ECHO MEAS - BZI_METRIC_WEIGHT: 142.9 KG
BH CV ECHO MEAS - EDV(CUBED): 145.4 ML
BH CV ECHO MEAS - EDV(MOD-SP4): 82 ML
BH CV ECHO MEAS - EDV(TEICH): 132.9 ML
BH CV ECHO MEAS - EF(CUBED): 72.5 %
BH CV ECHO MEAS - EF(MOD-SP4): 67.1 %
BH CV ECHO MEAS - EF(TEICH): 63.9 %
BH CV ECHO MEAS - ESV(CUBED): 39.9 ML
BH CV ECHO MEAS - ESV(MOD-SP4): 27 ML
BH CV ECHO MEAS - ESV(TEICH): 48 ML
BH CV ECHO MEAS - FS: 35 %
BH CV ECHO MEAS - IVS/LVPW: 1.3
BH CV ECHO MEAS - IVSD: 1.8 CM
BH CV ECHO MEAS - IVSS: 1.6 CM
BH CV ECHO MEAS - LA DIMENSION: 3.8 CM
BH CV ECHO MEAS - LA/AO: 0.98
BH CV ECHO MEAS - LV DIASTOLIC VOL/BSA (35-75): 32.1 ML/M^2
BH CV ECHO MEAS - LV MASS(C)D: 383.9 GRAMS
BH CV ECHO MEAS - LV MASS(C)DI: 150.1 GRAMS/M^2
BH CV ECHO MEAS - LV MASS(C)S: 224.5 GRAMS
BH CV ECHO MEAS - LV MASS(C)SI: 87.7 GRAMS/M^2
BH CV ECHO MEAS - LV SYSTOLIC VOL/BSA (12-30): 10.6 ML/M^2
BH CV ECHO MEAS - LVIDD: 5.3 CM
BH CV ECHO MEAS - LVIDS: 3.4 CM
BH CV ECHO MEAS - LVLD AP4: 7.6 CM
BH CV ECHO MEAS - LVLS AP4: 6.5 CM
BH CV ECHO MEAS - LVOT AREA (M): 3.5 CM^2
BH CV ECHO MEAS - LVOT AREA: 3.5 CM^2
BH CV ECHO MEAS - LVOT DIAM: 2.1 CM
BH CV ECHO MEAS - LVPWD: 1.4 CM
BH CV ECHO MEAS - LVPWS: 1.7 CM
BH CV ECHO MEAS - MV A MAX VEL: 102.7 CM/SEC
BH CV ECHO MEAS - MV E MAX VEL: 42.4 CM/SEC
BH CV ECHO MEAS - MV E/A: 0.41
BH CV ECHO MEAS - PA ACC SLOPE: 1234 CM/SEC^2
BH CV ECHO MEAS - PA ACC TIME: 0.11 SEC
BH CV ECHO MEAS - PA PR(ACCEL): 31.5 MMHG
BH CV ECHO MEAS - SI(AO): 141.9 ML/M^2
BH CV ECHO MEAS - SI(CUBED): 41.2 ML/M^2
BH CV ECHO MEAS - SI(MOD-SP4): 21.5 ML/M^2
BH CV ECHO MEAS - SI(TEICH): 33.2 ML/M^2
BH CV ECHO MEAS - SV(AO): 362.9 ML
BH CV ECHO MEAS - SV(CUBED): 105.5 ML
BH CV ECHO MEAS - SV(MOD-SP4): 55 ML
BH CV ECHO MEAS - SV(TEICH): 84.9 ML
MAXIMAL PREDICTED HEART RATE: 147 BPM
STRESS TARGET HR: 125 BPM

## 2019-06-29 DIAGNOSIS — I10 ESSENTIAL HYPERTENSION: ICD-10-CM

## 2019-06-29 DIAGNOSIS — E78.2 MIXED HYPERLIPIDEMIA: ICD-10-CM

## 2019-07-01 RX ORDER — ATORVASTATIN CALCIUM 40 MG/1
TABLET, FILM COATED ORAL
Qty: 90 TABLET | Refills: 3 | Status: SHIPPED | OUTPATIENT
Start: 2019-07-01

## 2019-07-01 RX ORDER — CARVEDILOL 6.25 MG/1
TABLET ORAL
Qty: 180 TABLET | Refills: 1 | Status: SHIPPED | OUTPATIENT
Start: 2019-07-01 | End: 2020-09-22

## 2019-08-07 ENCOUNTER — TELEPHONE (OUTPATIENT)
Dept: CARDIOLOGY | Facility: CLINIC | Age: 74
End: 2019-08-07

## 2019-08-07 NOTE — TELEPHONE ENCOUNTER
----- Message from Geno Dye MA sent at 8/7/2019 10:53 AM EDT -----  Thickness on LV wall from HTN. Improved from last echo  ----- Message -----  From: Maria D Kang CMA  Sent: 8/6/2019  11:02 AM  To: Phani Acevedo MD    Results on echo?

## 2019-08-07 NOTE — TELEPHONE ENCOUNTER
Patient returned our call. I explained to him that we had reviewed his echo and it appears that since his last one it is improved. It did show that there was some thickness to LV wall most likely from HTN but it was still an improvement. Patient is aware and understands.

## 2019-09-23 ENCOUNTER — OFFICE VISIT (OUTPATIENT)
Dept: ORTHOPEDIC SURGERY | Facility: CLINIC | Age: 74
End: 2019-09-23

## 2019-09-23 VITALS — WEIGHT: 315 LBS | BODY MASS INDEX: 44.1 KG/M2 | HEIGHT: 71 IN

## 2019-09-23 DIAGNOSIS — M17.0 ARTHRITIS OF BOTH KNEES: Primary | ICD-10-CM

## 2019-09-23 PROCEDURE — 20610 DRAIN/INJ JOINT/BURSA W/O US: CPT | Performed by: ORTHOPAEDIC SURGERY

## 2019-09-23 NOTE — PROGRESS NOTES
"Patient: Sofia Roth    YOB: 1945    Chief Complaint   Patient presents with   • Right Knee - Follow-up, Pain, Edema   • Left Knee - Follow-up, Pain, Edema         History of Present Illness: With known history of significant knee osteoarthritis.  Presents back today requesting attempted Visco supplementation.  We last injected him with some Marcaine and Depo-Medrol in April and a work for about 2 or 3 months.  He has been taking some CBD oil which he think is helping.    Past Medical History:   Diagnosis Date   • Arthritis    • Coronary artery disease    • Diabetes mellitus (CMS/[facility])    • Essential hypertension 8/7/2017        Social History     Socioeconomic History   • Marital status:      Spouse name: Not on file   • Number of children: Not on file   • Years of education: Not on file   • Highest education level: Not on file   Tobacco Use   • Smoking status: Former Smoker     Packs/day: 1.00     Years: 15.00     Pack years: 15.00     Types: Cigarettes, Pipe, Cigars   • Smokeless tobacco: Current User     Types: Chew   • Tobacco comment: quit smoking, still chews   Substance and Sexual Activity   • Alcohol use: No   • Drug use: No   • Sexual activity: Defer           Physical Exam: 74 y.o. male  General Appearance:    Alert and oriented x 3, cooperative, in no acute distress                   Vitals:    09/23/19 0943   Weight: (!) 147 kg (324 lb)   Height: 180.3 cm (71\")          Exam his knees are essentially unchanged      Radiology:         Large Joint Arthrocentesis: R knee  Date/Time: 9/23/2019 10:01 AM  Consent given by: patient  Site marked: site marked  Supporting Documentation  Indications: pain and diagnostic evaluation   Procedure Details  Location: knee - R knee  Needle size: 25 G  Approach: anterolateral  Medications administered: 48 mg hylan 48 MG/6ML  Patient tolerance: patient tolerated the procedure well with no immediate complications    Large Joint Arthrocentesis: L " knee  Date/Time: 9/23/2019 10:02 AM  Consent given by: patient  Site marked: site marked  Supporting Documentation  Indications: pain and joint swelling   Procedure Details  Location: knee - L knee  Needle size: 25 G  Approach: anterolateral  Medications administered: 48 mg hylan 48 MG/6ML  Patient tolerance: patient tolerated the procedure well with no immediate complications            Assessment/Plan: Significant bilateral knee osteoarthritis.  Was injected today with some Visco supplementation Synvisc 1.  Have discussed that I will be leaving the area.  If this injection does work he can repeat it in 6 months.  If is not helping much at all he can call us back but at some point he may need to consider a knee replacement.  Weight loss would be helpful                    Discussion/Summary:                This chart was completed utilizing the dragon speech recognition software.  Grammatical errors, random word insertions, pronoun errors, and incomplete sentences or occasional consequences of the system due to software limitations, ambient noise, and hardware issues.  Any questions or concerns about the content, text, or information contained within the body of this dictation should be directly addressed to the physician for clarification        This document was signed by Elia Catalan M.D. September 23, 2019 10:00 AM

## 2019-12-17 ENCOUNTER — OFFICE VISIT (OUTPATIENT)
Dept: CARDIOLOGY | Facility: CLINIC | Age: 74
End: 2019-12-17

## 2019-12-17 VITALS
WEIGHT: 315 LBS | HEIGHT: 71 IN | SYSTOLIC BLOOD PRESSURE: 165 MMHG | BODY MASS INDEX: 44.1 KG/M2 | HEART RATE: 75 BPM | DIASTOLIC BLOOD PRESSURE: 84 MMHG | OXYGEN SATURATION: 96 %

## 2019-12-17 DIAGNOSIS — I10 ESSENTIAL HYPERTENSION: ICD-10-CM

## 2019-12-17 DIAGNOSIS — E78.2 MIXED HYPERLIPIDEMIA: ICD-10-CM

## 2019-12-17 DIAGNOSIS — I25.5 ISCHEMIC CARDIOMYOPATHY: ICD-10-CM

## 2019-12-17 DIAGNOSIS — I25.10 CORONARY ARTERY DISEASE INVOLVING NATIVE CORONARY ARTERY OF NATIVE HEART WITHOUT ANGINA PECTORIS: Primary | ICD-10-CM

## 2019-12-17 PROCEDURE — 99213 OFFICE O/P EST LOW 20 MIN: CPT | Performed by: INTERNAL MEDICINE

## 2019-12-17 NOTE — PROGRESS NOTES
[facility] CARDIOLOGY  [address] [facility] [address]  [place] [address]  Phone: [phone]  Fax: [phone]    12/17/2019    Chief Complaint   Patient presents with   • Cardiomyopathy   • Hypertension   • Hyperlipidemia   • Coronary Artery Disease        History:   Sofia Roth is a 74 y.o. male seen in follow up 6 months. He has a past history of CAD, DM, cardiomyopathy, and HTN,  He denies any chest pain, shortness of breath or palpitations.  Heart function has improved EF 50% in June 2019.  He recently had labs drawn by his PCP. Results are pending to faxed.     The chart and medications were reviewed today.    Past Medical History:   Diagnosis Date   • Arthritis    • Coronary artery disease    • Diabetes mellitus (CMS/HCC)    • Essential hypertension 8/7/2017       Past Surgical History:   Procedure Laterality Date   • BACK SURGERY     • CARDIAC CATHETERIZATION N/A 6/15/2017    Procedure: Left Heart Cath;  Surgeon: Josué Mcneill MD;  Location:  [facility] CATH INVASIVE LOCATION;  Service:    • CARDIAC CATHETERIZATION N/A 6/15/2017    Procedure: Left Heart Cath;  Surgeon: Josué Mcneill MD;  Location:  [facility] CATH INVASIVE LOCATION;  Service:    • HERNIA REPAIR     • PA RT/LT HEART CATHETERS N/A 6/15/2017    Procedure: Percutaneous Coronary Intervention;  Surgeon: Josué Mcneill MD;  Location:  [facility] CATH INVASIVE LOCATION;  Service: Cardiology   • TONSILLECTOMY     • VASECTOMY          Past Social History:  Social History     Socioeconomic History   • Marital status:      Spouse name: Not on file   • Number of children: Not on file   • Years of education: Not on file   • Highest education level: Not on file   Tobacco Use   • Smoking status: Former Smoker     Packs/day: 1.00     Years: 15.00     Pack years: 15.00     Types: Cigarettes, Pipe, Cigars   • Smokeless tobacco: Former User     Types: Chew   Substance and Sexual Activity   • Alcohol use: No   • Drug use: No   •  Sexual activity: Defer       Past Family History:  Family History   Problem Relation Age of Onset   • Cancer Mother    • Early death Mother    • Heart disease Father    • Diabetes Sister    • Osteoarthritis Sister    • Osteoporosis Sister    • Heart disease Sister    • Heart disease Paternal Uncle    • Diabetes Maternal Grandfather        Review of Systems:   Review of Systems   Constitution: Negative for diaphoresis, fever, weight gain and weight loss.   HENT: Negative for congestion, nosebleeds and sore throat.    Eyes: Negative for blurred vision and visual disturbance.   Cardiovascular: Positive for leg swelling. Negative for chest pain, claudication, dyspnea on exertion, irregular heartbeat, orthopnea, palpitations, paroxysmal nocturnal dyspnea and syncope.   Respiratory: Negative for cough, hemoptysis, shortness of breath, sleep disturbances due to breathing, snoring, sputum production and wheezing.    Endocrine: Negative for cold intolerance, heat intolerance, polydipsia, polyphagia and polyuria.   Hematologic/Lymphatic: Negative for bleeding problem.   Skin: Negative for dry skin, itching and rash.   Musculoskeletal: Positive for muscle weakness. Negative for muscle cramps and myalgias.   Gastrointestinal: Negative for abdominal pain, constipation, diarrhea, heartburn, hematemesis, hematochezia, hemorrhoids, melena, nausea and vomiting.   Genitourinary: Negative for hematuria and nocturia.   Neurological: Negative for excessive daytime sleepiness, dizziness, focal weakness, headaches, light-headedness, numbness, seizures, vertigo and weakness.   Psychiatric/Behavioral: Negative for depression, substance abuse and suicidal ideas.   Allergic/Immunologic: Negative for environmental allergies.         Current Outpatient Medications   Medication Sig Dispense Refill   • aspirin 81 MG EC tablet Take 1 tablet by mouth Daily. 30 tablet 11   • atorvastatin (LIPITOR) 40 MG tablet TAKE 1 TABLET BY MOUTH ONCE DAILY 90  "tablet 3   • carvedilol (COREG) 6.25 MG tablet TAKE 1 TABLET BY MOUTH TWICE DAILY WITH FOOD 180 tablet 1   • CBD (cannabidiol) oral oil Take  by mouth.     • clopidogrel (PLAVIX) 75 MG tablet Take 1 tablet by mouth Daily. 30 tablet 11   • lisinopril (PRINIVIL,ZESTRIL) 10 MG tablet Take 1 tablet by mouth Daily. 30 tablet 11   • metFORMIN (GLUCOPHAGE) 500 MG tablet Take 500 mg by mouth 2 (Two) Times a Day With Meals.     • tamsulosin (FLOMAX) 0.4 MG capsule 24 hr capsule Take 1 capsule by mouth Every Night.       No current facility-administered medications for this visit.         No Known Allergies    Objective     /84 (BP Location: Left arm, Patient Position: Sitting)   Pulse 75   Ht 180.3 cm (71\")   Wt (!) 151 kg (333 lb 12.8 oz)   SpO2 96%   BMI 46.56 kg/m²     Physical Exam   Constitutional: He is oriented to person, place, and time. He appears well-developed and well-nourished.   HENT:   Head: Normocephalic.   Eyes: Pupils are equal, round, and reactive to light.   Neck: Neck supple. No JVD present. No thyromegaly present.   Cardiovascular: Normal rate and regular rhythm.   Pulmonary/Chest: Effort normal and breath sounds normal.   Abdominal: Soft. Bowel sounds are normal.   Musculoskeletal: He exhibits no edema.   Neurological: He is alert and oriented to person, place, and time.   Skin: Skin is warm. No erythema.   Psychiatric: He has a normal mood and affect. Judgment normal.       DATA:      Results for orders placed during the hospital encounter of 06/17/19   Adult Transthoracic Echo Complete W/ Cont if Necessary Per Protocol    Narrative · The study is technically difficult for diagnosis. The quality of the   study is limited due to poor acoustic windows related to patient body   habitus and patient positioning  · LV systolic function appears grossly normal, EF 50% , regional wall   motion not well assessed but apex appears hypokinetic.  · Left ventricular wall thickness is consistent with " moderate concentric   hypertrophy.  · Left atrial cavity size is mildly dilated.  · Left ventricular diastolic dysfunction (grade I) consistent with   impaired relaxation.  · Aortiv valve sclerosis with no stenosis.  · EF appears slightly improved compared to previous study, but both   studies were dont with NO contrast and difficult to assess wall motion and   EF accurately.             Results for orders placed during the hospital encounter of 06/15/17   Cardiac Catheterization/Vascular Study    Narrative · Right dominant coronary artery system with a patent stent in the   proximal LAD     Final impression:  Right dominant coronary artery system without hemodynamically significant   coronary artery disease and widely patent stent in the proximal LAD.    Procedures performed:  Coronary angiography    History of present illness:  Mr. Roth is a pleasant 72-year-old gentleman who resented to the   hospital earlier today with an ST elevation myocardial infarction in the   anterior territory.  He underwent drug-eluting stent implantation to the   proximal LAD without complication.  He had recurrent chest discomfort   postprocedure.  As such, he was brought back to the cardiac   catheterization laboratory.  Prior to the procedure the patient was given   informed consent apprising him of the risk of heart attack, stroke and   death.  The patient understood the risks and agreed to proceed.    Procedure in detail:  The patient was brought to the cardiac catheterization laboratory and   prepped and draped in the usual sterile fashion.  Adequate anesthesia was   obtained by infiltrating the right groin with local lidocaine.  The prior   sheath was exchanged over wire and a 5 Italian JL4 catheter was used to   engage the ostium of the left main coronary artery.  Angiography was   performed in varying views using hand injection of contrast material.  At   the conclusion of the procedure the patient was returned to the floor    without evidence of complication.  Sheath was pulled using manual   compression.    Angiographic findings in detail:  Left Main coronary artery:  Left main coronary artery is a large vessel which bifurcates into the LAD   and circumflex arteries.  The left main coronary artery is free of   atherosclerotic disease.    Left anterior descending artery:  Left anterior descending artery is a large vessel which reaches beyond the   apex the ventricle and gives rise to 2 moderate-sized diagonal branches.    There is a previously implanted stent in the proximal LAD which is widely   patent without evidence of in-stent thrombosis or itch dissection.  There   is GWENDOLYN-3 flow in the distal vascular bed.  The first diagonal branch is   noted to have a 60% lesion in its ostium and its midsegment.    Circumflex artery:  The circumflex artery is a large vessel which gives rise to 3 obtuse   marginal branches the first 2 of which are large and the third of which is   small.  There is a 30% lesion in the midportion circumflex artery and a   30% lesion noted in the proximal portion of the first obtuse marginal   branch.    Final impression and plan:  Overall it is my impression that Mr. Roth has undergone successful   percutaneous revascularization as previously described.  His stent is   widely patent without evidence of thrombosis.  He will undergo typical   management post myocardial infarction.       Procedures             Assessment:  Essential HTN  ischemic cardiomyopathy  CAD.  Successful percutaneous revascularization in 2017    Plan:   1. He appears to be doing well from a cardiac standpoint.   2. Will get his labs from PCP.  3. Follow up in 6 months unless otherwise needed.     Recommended increase activity to 30 minutes of walking daily, most days of the week.  Discussed diet and weight loss with patient.        Patient's Body mass index is 46.56 kg/m². BMI is above normal parameters. Recommendations include: exercise  counseling and nutrition counseling.       Return in about 6 months (around 6/17/2020).    Thank you for allowing me to participate in the care of Sofia Roth. Feel free to contact me directly with any further questions or concerns.          Phani Acevedo MD, [facility]  Interventional Cardiology    Geno Dye KWAKU, acting as scribe for Phani Acevedo MD, [facility], [facility].     12/17/19  4:38 PM

## 2020-01-15 ENCOUNTER — HOSPITAL ENCOUNTER (OUTPATIENT)
Dept: ULTRASOUND IMAGING | Facility: HOSPITAL | Age: 75
Discharge: HOME | End: 2020-01-15
Admitting: FAMILY MEDICINE

## 2020-01-15 ENCOUNTER — APPOINTMENT (OUTPATIENT)
Dept: CV DIAGNOSTICS | Facility: HOSPITAL | Age: 75
End: 2020-01-15

## 2020-01-15 ENCOUNTER — TRANSCRIBE ORDERS (OUTPATIENT)
Dept: ADMINISTRATIVE | Facility: HOSPITAL | Age: 75
End: 2020-01-15

## 2020-01-15 DIAGNOSIS — M79.605 LEFT LEG PAIN: Primary | ICD-10-CM

## 2020-01-15 DIAGNOSIS — M79.605 LEFT LEG PAIN: ICD-10-CM

## 2020-01-15 PROCEDURE — 93971 EXTREMITY STUDY: CPT

## 2020-01-15 PROCEDURE — 93971 EXTREMITY STUDY: CPT | Performed by: RADIOLOGY

## 2020-06-18 ENCOUNTER — OFFICE VISIT (OUTPATIENT)
Dept: CARDIOLOGY | Facility: CLINIC | Age: 75
End: 2020-06-18

## 2020-06-18 VITALS
HEART RATE: 79 BPM | BODY MASS INDEX: 42.66 KG/M2 | SYSTOLIC BLOOD PRESSURE: 141 MMHG | DIASTOLIC BLOOD PRESSURE: 81 MMHG | HEIGHT: 72 IN | WEIGHT: 315 LBS | OXYGEN SATURATION: 94 %

## 2020-06-18 DIAGNOSIS — I25.5 ISCHEMIC CARDIOMYOPATHY: ICD-10-CM

## 2020-06-18 DIAGNOSIS — I25.10 CORONARY ARTERY DISEASE INVOLVING NATIVE CORONARY ARTERY OF NATIVE HEART WITHOUT ANGINA PECTORIS: Primary | ICD-10-CM

## 2020-06-18 DIAGNOSIS — I10 ESSENTIAL HYPERTENSION: ICD-10-CM

## 2020-06-18 DIAGNOSIS — E78.2 MIXED HYPERLIPIDEMIA: ICD-10-CM

## 2020-06-18 PROCEDURE — 99214 OFFICE O/P EST MOD 30 MIN: CPT | Performed by: INTERNAL MEDICINE

## 2020-06-18 RX ORDER — OMEPRAZOLE 20 MG/1
20 CAPSULE, DELAYED RELEASE ORAL DAILY
COMMUNITY

## 2020-06-18 RX ORDER — HYDROCHLOROTHIAZIDE 12.5 MG/1
TABLET ORAL
COMMUNITY
Start: 2020-06-01 | End: 2021-03-29 | Stop reason: SDUPTHER

## 2020-06-19 ENCOUNTER — OFFICE VISIT (OUTPATIENT)
Dept: ORTHOPEDIC SURGERY | Facility: CLINIC | Age: 75
End: 2020-06-19

## 2020-06-19 VITALS — BODY MASS INDEX: 42.66 KG/M2 | HEIGHT: 72 IN | WEIGHT: 315 LBS | TEMPERATURE: 96 F

## 2020-06-19 DIAGNOSIS — M25.461 BILATERAL KNEE EFFUSIONS: ICD-10-CM

## 2020-06-19 DIAGNOSIS — M25.462 BILATERAL KNEE EFFUSIONS: ICD-10-CM

## 2020-06-19 DIAGNOSIS — M17.0 ARTHRITIS OF BOTH KNEES: Primary | ICD-10-CM

## 2020-06-19 PROCEDURE — 20610 DRAIN/INJ JOINT/BURSA W/O US: CPT | Performed by: ORTHOPAEDIC SURGERY

## 2020-06-19 RX ADMIN — METHYLPREDNISOLONE ACETATE 80 MG: 80 INJECTION, SUSPENSION INTRA-ARTICULAR; INTRALESIONAL; INTRAMUSCULAR; SOFT TISSUE at 12:53

## 2020-06-19 RX ADMIN — LIDOCAINE HYDROCHLORIDE 5 ML: 20 INJECTION, SOLUTION INFILTRATION; PERINEURAL at 12:53

## 2020-06-19 RX ADMIN — LIDOCAINE HYDROCHLORIDE 5 ML: 20 INJECTION, SOLUTION INFILTRATION; PERINEURAL at 12:54

## 2020-06-19 NOTE — PROGRESS NOTES
Follow-up Visit         Patient: Sofia Roth  YOB: 1945  Date of Encounter: 06/19/2020      Chief  Complaint:   Chief Complaint   Patient presents with   • Right Knee - Follow-up, Pain, Edema   • Left Knee - Follow-up, Pain, Edema           HPI:  Sofia Roth, 75 y.o. male presents in follow-up with known osteoarthritis bilateral knees he has received Synvisc in the past per Dr. Calleines September 2019.  He reports only a few days relief with his injections.  Currently his right knee is the most symptomatic.        Medical History:  Patient Active Problem List   Diagnosis   • Coronary artery disease involving native coronary artery of native heart without angina pectoris   • Obesity, Class III, BMI 40-49.9 (morbid obesity) (CMS/[facility])   • Ischemic cardiomyopathy   • Essential hypertension   • Mixed hyperlipidemia   • Arthritis of both knees     Past Medical History:   Diagnosis Date   • Arthritis    • Coronary artery disease    • Diabetes mellitus (CMS/[facility])    • Essential hypertension 8/7/2017           Social History:  Social History     Socioeconomic History   • Marital status:      Spouse name: Not on file   • Number of children: Not on file   • Years of education: Not on file   • Highest education level: Not on file   Tobacco Use   • Smoking status: Former Smoker     Packs/day: 1.00     Years: 15.00     Pack years: 15.00     Types: Cigarettes, Pipe, Cigars   • Smokeless tobacco: Former User     Types: Chew   Substance and Sexual Activity   • Alcohol use: No   • Drug use: No   • Sexual activity: Defer           Surgical History:  Past Surgical History:   Procedure Laterality Date   • BACK SURGERY     • CARDIAC CATHETERIZATION N/A 6/15/2017    Procedure: Left Heart Cath;  Surgeon: Josué Mcneill MD;  Location: [facility] INVASIVE LOCATION;  Service:    • CARDIAC CATHETERIZATION N/A 6/15/2017    Procedure: Left Heart Cath;  Surgeon: Josué Mcneill MD;  Location: [facility]  INVASIVE LOCATION;  Service:    • HERNIA REPAIR     • OR RT/LT HEART CATHETERS N/A 6/15/2017    Procedure: Percutaneous Coronary Intervention;  Surgeon: Josué Mcneill MD;  Location: [facility] INVASIVE LOCATION;  Service: Cardiology   • TONSILLECTOMY     • VASECTOMY             Radiology:   Radiographs reviewed from previous visit show advanced osteoarthritis with complete loss of joint space medial compartments.    Examination:   Examination bilateral knees reveals moderate effusion bilaterally greater on the right.  Mild flexion contracture bilaterally range of motion 0 to 90 degrees no instability neurovascular exam grossly intact        Assessment & Plan:   75 y.o. male presents known osteoarthritis bilateral knees currently right knee is the most symptomatic we discussed his options I am not convinced he will receive significant benefit from Visco supplementation.  After long discussion we agreed to treat the right knee with aspiration steroid injection removing 18 cc of serous fluid injecting Depo-Medrol 80 mg intra-articular.  Left knee is treated with aspiration and Monovisc injection aspirating 8 cc.  He will return on an as-needed basis we will evaluate improvement in symptoms with the above injections.         Diagnosis Plan   1. Arthritis of both knees           Large Joint Arthrocentesis: R knee  Date/Time: 6/19/2020 12:53 PM  Consent given by: patient  Site marked: site marked  Timeout: Immediately prior to procedure a time out was called to verify the correct patient, procedure, equipment, support staff and site/side marked as required   Supporting Documentation  Indications: pain and joint swelling   Procedure Details  Location: knee - R knee  Preparation: Patient was prepped and draped in the usual sterile fashion  Needle size: 18 G  Approach: anterolateral  Medications administered: 80 mg methylPREDNISolone acetate 80 MG/ML; 5 mL lidocaine 2%  Aspirate amount: 18 mL  Aspirate: serous  Patient  tolerance: patient tolerated the procedure well with no immediate complications    Large Joint Arthrocentesis: L knee  Date/Time: 6/19/2020 12:54 PM  Consent given by: patient  Site marked: site marked  Timeout: Immediately prior to procedure a time out was called to verify the correct patient, procedure, equipment, support staff and site/side marked as required   Supporting Documentation  Indications: pain and joint swelling   Procedure Details  Location: knee - L knee  Preparation: Patient was prepped and draped in the usual sterile fashion  Needle size: 18 G  Approach: anterolateral  Medications administered: 88 mg Hyaluronan 88 MG/4ML; 5 mL lidocaine 2%  Aspirate amount: 8 mL  Aspirate: serous  Patient tolerance: patient tolerated the procedure well with no immediate complications              Cc:  Raza Boogie MD              This document has been electronically signed by Jermain Simental MD   June 19, 2020 12:52

## 2020-06-22 RX ORDER — METHYLPREDNISOLONE ACETATE 80 MG/ML
80 INJECTION, SUSPENSION INTRA-ARTICULAR; INTRALESIONAL; INTRAMUSCULAR; SOFT TISSUE
Status: COMPLETED | OUTPATIENT
Start: 2020-06-19 | End: 2020-06-19

## 2020-06-22 RX ORDER — LIDOCAINE HYDROCHLORIDE 20 MG/ML
5 INJECTION, SOLUTION INFILTRATION; PERINEURAL
Status: COMPLETED | OUTPATIENT
Start: 2020-06-19 | End: 2020-06-19

## 2020-06-26 ENCOUNTER — HOSPITAL ENCOUNTER (OUTPATIENT)
Dept: CARDIOLOGY | Facility: HOSPITAL | Age: 75
Discharge: HOME | End: 2020-06-26
Admitting: INTERNAL MEDICINE

## 2020-06-26 PROCEDURE — 93306 TTE W/DOPPLER COMPLETE: CPT

## 2020-06-26 PROCEDURE — 93306 TTE W/DOPPLER COMPLETE: CPT | Performed by: INTERNAL MEDICINE

## 2020-06-29 LAB
BH CV ECHO MEAS - % IVS THICK: -23.3 %
BH CV ECHO MEAS - % LVPW THICK: 4.1 %
BH CV ECHO MEAS - ACS: 2.2 CM
BH CV ECHO MEAS - AO MAX PG: 10 MMHG
BH CV ECHO MEAS - AO MEAN PG: 4 MMHG
BH CV ECHO MEAS - AO ROOT AREA (BSA CORRECTED): 1.5
BH CV ECHO MEAS - AO ROOT AREA: 11.9 CM^2
BH CV ECHO MEAS - AO ROOT DIAM: 3.9 CM
BH CV ECHO MEAS - AO V2 MAX: 158 CM/SEC
BH CV ECHO MEAS - AO V2 MEAN: 83.4 CM/SEC
BH CV ECHO MEAS - AO V2 VTI: 32.5 CM
BH CV ECHO MEAS - BSA(HAYCOCK): 2.8 M^2
BH CV ECHO MEAS - BSA: 2.6 M^2
BH CV ECHO MEAS - BZI_BMI: 45.3 KILOGRAMS/M^2
BH CV ECHO MEAS - BZI_METRIC_HEIGHT: 182.9 CM
BH CV ECHO MEAS - BZI_METRIC_WEIGHT: 151.5 KG
BH CV ECHO MEAS - EDV(CUBED): 126.5 ML
BH CV ECHO MEAS - EDV(MOD-SP4): 122 ML
BH CV ECHO MEAS - EDV(TEICH): 119.3 ML
BH CV ECHO MEAS - EF(CUBED): 57.3 %
BH CV ECHO MEAS - EF(MOD-SP4): 58.3 %
BH CV ECHO MEAS - EF(TEICH): 48.7 %
BH CV ECHO MEAS - ESV(CUBED): 54 ML
BH CV ECHO MEAS - ESV(MOD-SP4): 50.9 ML
BH CV ECHO MEAS - ESV(TEICH): 61.2 ML
BH CV ECHO MEAS - FS: 24.7 %
BH CV ECHO MEAS - IVS/LVPW: 1.1
BH CV ECHO MEAS - IVSD: 1.7 CM
BH CV ECHO MEAS - IVSS: 1.3 CM
BH CV ECHO MEAS - LA DIMENSION: 4.9 CM
BH CV ECHO MEAS - LA/AO: 1.2
BH CV ECHO MEAS - LV DIASTOLIC VOL/BSA (35-75): 46.1 ML/M^2
BH CV ECHO MEAS - LV MASS(C)D: 344.9 GRAMS
BH CV ECHO MEAS - LV MASS(C)DI: 130.2 GRAMS/M^2
BH CV ECHO MEAS - LV MASS(C)S: 192.7 GRAMS
BH CV ECHO MEAS - LV MASS(C)SI: 72.7 GRAMS/M^2
BH CV ECHO MEAS - LV SYSTOLIC VOL/BSA (12-30): 19.2 ML/M^2
BH CV ECHO MEAS - LVIDD: 5 CM
BH CV ECHO MEAS - LVIDS: 3.8 CM
BH CV ECHO MEAS - LVLD AP4: 8 CM
BH CV ECHO MEAS - LVLS AP4: 7.1 CM
BH CV ECHO MEAS - LVOT AREA (M): 3.5 CM^2
BH CV ECHO MEAS - LVOT AREA: 3.5 CM^2
BH CV ECHO MEAS - LVOT DIAM: 2.1 CM
BH CV ECHO MEAS - LVPWD: 1.5 CM
BH CV ECHO MEAS - LVPWS: 1.5 CM
BH CV ECHO MEAS - MV A MAX VEL: 84.8 CM/SEC
BH CV ECHO MEAS - MV E MAX VEL: 45.4 CM/SEC
BH CV ECHO MEAS - MV E/A: 0.54
BH CV ECHO MEAS - PA ACC TIME: 0.08 SEC
BH CV ECHO MEAS - PA PR(ACCEL): 42.6 MMHG
BH CV ECHO MEAS - SI(AO): 146.6 ML/M^2
BH CV ECHO MEAS - SI(CUBED): 27.4 ML/M^2
BH CV ECHO MEAS - SI(MOD-SP4): 26.8 ML/M^2
BH CV ECHO MEAS - SI(TEICH): 22 ML/M^2
BH CV ECHO MEAS - SV(AO): 388.2 ML
BH CV ECHO MEAS - SV(CUBED): 72.5 ML
BH CV ECHO MEAS - SV(MOD-SP4): 71.1 ML
BH CV ECHO MEAS - SV(TEICH): 58.2 ML
MAXIMAL PREDICTED HEART RATE: 146 BPM
STRESS TARGET HR: 124 BPM

## 2020-07-01 ENCOUNTER — TELEPHONE (OUTPATIENT)
Dept: CARDIOLOGY | Facility: CLINIC | Age: 75
End: 2020-07-01

## 2020-07-01 NOTE — TELEPHONE ENCOUNTER
----- Message from Phani Acevedo MD sent at 6/29/2020  6:21 PM EDT -----  Please call patient to and tell him his test was normal.   Thanks.        Patient notified of test results.  7-1-2020 11:12 am/kirsty

## 2020-09-22 ENCOUNTER — OFFICE VISIT (OUTPATIENT)
Dept: CARDIOLOGY | Facility: CLINIC | Age: 75
End: 2020-09-22

## 2020-09-22 VITALS
HEART RATE: 80 BPM | SYSTOLIC BLOOD PRESSURE: 149 MMHG | WEIGHT: 315 LBS | OXYGEN SATURATION: 96 % | HEIGHT: 71 IN | DIASTOLIC BLOOD PRESSURE: 82 MMHG | BODY MASS INDEX: 44.1 KG/M2

## 2020-09-22 DIAGNOSIS — I25.10 CORONARY ARTERY DISEASE INVOLVING NATIVE CORONARY ARTERY OF NATIVE HEART WITHOUT ANGINA PECTORIS: Primary | ICD-10-CM

## 2020-09-22 DIAGNOSIS — I10 ESSENTIAL HYPERTENSION: ICD-10-CM

## 2020-09-22 DIAGNOSIS — E66.01 OBESITY, CLASS III, BMI 40-49.9 (MORBID OBESITY) (HCC): ICD-10-CM

## 2020-09-22 DIAGNOSIS — E78.2 MIXED HYPERLIPIDEMIA: ICD-10-CM

## 2020-09-22 DIAGNOSIS — I25.5 ISCHEMIC CARDIOMYOPATHY: ICD-10-CM

## 2020-09-22 PROCEDURE — 99213 OFFICE O/P EST LOW 20 MIN: CPT | Performed by: INTERNAL MEDICINE

## 2020-09-22 RX ORDER — CARVEDILOL 12.5 MG/1
6.25 TABLET ORAL 2 TIMES DAILY WITH MEALS
Qty: 60 TABLET | Refills: 3 | Status: SHIPPED | OUTPATIENT
Start: 2020-09-22 | End: 2020-12-22

## 2020-09-22 NOTE — PROGRESS NOTES
[facility] CARDIOLOGY  [address] [facility] [address]  [place] [address]  Phone: [phone]  Fax: [phone]    09/22/2020    Chief Complaint   Patient presents with   • Coronary Artery Disease   • Congestive Heart Failure   • Cardiomyopathy   • Hypertension   • Dyslipidemia        History:   Sofia Roth is a 75 y.o. male seen in followup, He has a past medical history of CAD, he had a massive anterior MI in 2017 status post successful PCI with stent in the proximal mid LAD, non-insulin dependent type 2 diabetes, and essential hypertension, resenting for regular follow-up, he denies any symptoms of chest pain, unfortunately he has been more sedentary over the last year or so, he has bilateral bad joint pain in his knees and not able to ambulate much, he also has some swelling in his lower extremities and he was started on hydrochlorothiazide 12.5 mg daily by his primary care physician, he does feel his swelling has improved since he began taking the hydrochlorothiazide.  The chart and medications were reviewed today. Problems above addressed this visit.    Past Medical History:   Diagnosis Date   • Arthritis    • Coronary artery disease    • Diabetes mellitus (CMS/[facility])    • Essential hypertension 8/7/2017       Past Surgical History:   Procedure Laterality Date   • BACK SURGERY     • CARDIAC CATHETERIZATION N/A 6/15/2017    Procedure: Left Heart Cath;  Surgeon: Josué Mcneill MD;  Location:  [facility] CATH INVASIVE LOCATION;  Service:    • CARDIAC CATHETERIZATION N/A 6/15/2017    Procedure: Left Heart Cath;  Surgeon: Josué Mcneill MD;  Location:  [facility] CATH INVASIVE LOCATION;  Service:    • HERNIA REPAIR     • KS RT/LT HEART CATHETERS N/A 6/15/2017    Procedure: Percutaneous Coronary Intervention;  Surgeon: Josué Mcneill MD;  Location:  [facility] CATH INVASIVE LOCATION;  Service: Cardiology   • TONSILLECTOMY     • VASECTOMY          Past Social History:  Social History          Socioeconomic History   • Marital status:      Spouse name: Not on file   • Number of children: Not on file   • Years of education: Not on file   • Highest education level: Not on file   Tobacco Use   • Smoking status: Former Smoker     Packs/day: 1.00     Years: 15.00     Pack years: 15.00     Types: Cigarettes, Pipe, Cigars   • Smokeless tobacco: Former User     Types: Chew   Substance and Sexual Activity   • Alcohol use: No   • Drug use: No   • Sexual activity: Defer       Past Family History:  Family History   Problem Relation Age of Onset   • Cancer Mother    • Early death Mother    • Heart disease Father    • Diabetes Sister    • Osteoarthritis Sister    • Osteoporosis Sister    • Heart disease Sister    • Heart disease Paternal Uncle    • Diabetes Maternal Grandfather        Review of Systems:   Review of Systems   Constitution: Positive for malaise/fatigue. Negative for diaphoresis, fever, weight gain and weight loss.   HENT: Negative for congestion, nosebleeds and sore throat.    Eyes: Negative for blurred vision and visual disturbance.   Cardiovascular: Negative for chest pain, claudication, dyspnea on exertion, irregular heartbeat, leg swelling, orthopnea, palpitations, paroxysmal nocturnal dyspnea and syncope.   Respiratory: Negative for cough, hemoptysis, shortness of breath, sleep disturbances due to breathing, snoring, sputum production and wheezing.    Endocrine: Negative for cold intolerance, heat intolerance, polydipsia, polyphagia and polyuria.   Hematologic/Lymphatic: Negative for bleeding problem.   Skin: Negative for dry skin, itching and rash.   Musculoskeletal: Negative for muscle cramps, muscle weakness and myalgias.   Gastrointestinal: Negative for abdominal pain, constipation, diarrhea, heartburn, hematemesis, hematochezia, hemorrhoids, melena, nausea and vomiting.   Genitourinary: Negative for hematuria and nocturia.   Neurological: Negative for excessive daytime sleepiness,  "dizziness, focal weakness, headaches, light-headedness, numbness, seizures, vertigo and weakness.   Psychiatric/Behavioral: Negative for depression, substance abuse and suicidal ideas.   Allergic/Immunologic: Negative for environmental allergies.         Current Outpatient Medications   Medication Sig Dispense Refill   • aspirin 81 MG EC tablet Take 1 tablet by mouth Daily. 30 tablet 11   • atorvastatin (LIPITOR) 40 MG tablet TAKE 1 TABLET BY MOUTH ONCE DAILY 90 tablet 3   • carvedilol (COREG) 6.25 MG tablet TAKE 1 TABLET BY MOUTH TWICE DAILY WITH FOOD 180 tablet 1   • lisinopril (PRINIVIL,ZESTRIL) 10 MG tablet Take 1 tablet by mouth Daily. 30 tablet 11   • metFORMIN (GLUCOPHAGE) 500 MG tablet Take 500 mg by mouth 2 (Two) Times a Day With Meals.     • omeprazole (priLOSEC) 20 MG capsule Take 20 mg by mouth Daily.     • clopidogrel (PLAVIX) 75 MG tablet Take 1 tablet by mouth Daily. 30 tablet 11   • hydroCHLOROthiazide (HYDRODIURIL) 12.5 MG tablet        No current facility-administered medications for this visit.         No Known Allergies    Objective     /82 (BP Location: Right arm)   Pulse 80   Ht 180.3 cm (71\")   Wt (!) 153 kg (336 lb 6.4 oz)   SpO2 96%   BMI 46.92 kg/m²     Physical Exam  Constitutional:       Appearance: He is well-developed.   HENT:      Head: Normocephalic and atraumatic.   Eyes:      Pupils: Pupils are equal, round, and reactive to light.   Neck:      Musculoskeletal: Normal range of motion and neck supple.   Cardiovascular:      Rate and Rhythm: Normal rate and regular rhythm.   Pulmonary:      Effort: Pulmonary effort is normal.      Breath sounds: Normal breath sounds.   Abdominal:      General: Bowel sounds are normal.      Palpations: Abdomen is soft.   Musculoskeletal: Normal range of motion.   Skin:     General: Skin is warm and dry.      Capillary Refill: Capillary refill takes less than 2 seconds.   Neurological:      Mental Status: He is alert and oriented to person, " place, and time.         DATA:      Results for orders placed in visit on 06/18/20   Adult Transthoracic Echo Complete W/ Cont if Necessary Per Protocol    Narrative · The study is technically difficult for diagnosis. The quality of the   study is limited due to poor acoustic windows related to patient body   habitus.  · LV endocardium not well visualized, EF appears grossly normal ~ 55%,   anteroseptum and apex appears hypokinetic, not well visulized.  · Left ventricular wall thickness is consistent with mild concentric   hypertrophy.  · LV systolic function appears improved compared to previous study  · There is no evidence of pericardial effusion.             Results for orders placed during the hospital encounter of 06/15/17   Cardiac Catheterization/Vascular Study    Narrative · Right dominant coronary artery system with a patent stent in the   proximal LAD     Final impression:  Right dominant coronary artery system without hemodynamically significant   coronary artery disease and widely patent stent in the proximal LAD.    Procedures performed:  Coronary angiography    History of present illness:  Mr. Roth is a pleasant 72-year-old gentleman who resented to the   hospital earlier today with an ST elevation myocardial infarction in the   anterior territory.  He underwent drug-eluting stent implantation to the   proximal LAD without complication.  He had recurrent chest discomfort   postprocedure.  As such, he was brought back to the cardiac   catheterization laboratory.  Prior to the procedure the patient was given   informed consent apprising him of the risk of heart attack, stroke and   death.  The patient understood the risks and agreed to proceed.    Procedure in detail:  The patient was brought to the cardiac catheterization laboratory and   prepped and draped in the usual sterile fashion.  Adequate anesthesia was   obtained by infiltrating the right groin with local lidocaine.  The prior   sheath was  exchanged over wire and a 5 Faro[place] JL4 catheter was used to   engage the ostium of the left main coronary artery.  Angiography was   performed in varying views using hand injection of contrast material.  At   the conclusion of the procedure the patient was returned to the floor   without evidence of complication.  Sheath was pulled using manual   compression.    Angiographic findings in detail:  Left Main coronary artery:  Left main coronary artery is a large vessel which bifurcates into the LAD   and circumflex arteries.  The left main coronary artery is free of   atherosclerotic disease.    Left anterior descending artery:  Left anterior descending artery is a large vessel which reaches beyond the   apex the ventricle and gives rise to 2 moderate-sized diagonal branches.    There is a previously implanted stent in the proximal LAD which is widely   patent without evidence of in-stent thrombosis or itch dissection.  There   is GWENDOLYN-3 flow in the distal vascular bed.  The first diagonal branch is   noted to have a 60% lesion in its ostium and its midsegment.    Circumflex artery:  The circumflex artery is a large vessel which gives rise to 3 obtuse   marginal branches the first 2 of which are large and the third of which is   small.  There is a 30% lesion in the midportion circumflex artery and a   30% lesion noted in the proximal portion of the first obtuse marginal   branch.    Final impression and plan:  Overall it is my impression that Mr. Roth has undergone successful   percutaneous revascularization as previously described.  His stent is   widely patent without evidence of thrombosis.  He will undergo typical   management post myocardial infarction.       Procedures     Lab Results   Component Value Date    CHOL 171 06/16/2017    CHOL 235 (H) 06/15/2017     Lab Results   Component Value Date    TRIG 104 06/16/2017    TRIG 171 (H) 06/15/2017     Lab Results   Component Value Date    HDL 31 (L) 06/16/2017     HDL 42 (L) 06/15/2017     Lab Results   Component Value Date     (H) 06/16/2017     (H) 06/15/2017       Lab Results   Component Value Date    TSH 1.797 06/16/2017               Invalid input(s): ANGELA CROCKETT        Assessment and Plan    1. Coronary artery disease involving native coronary artery of native heart without angina pectoris      2. Ischemic cardiomyopathy      3. Essential hypertension    - carvedilol (COREG) 12.5 MG tablet; Take 0.5 tablets by mouth 2 (Two) Times a Day With Meals.  Dispense: 60 tablet; Refill: 3    4. Mixed hyperlipidemia      5. Obesity, Class III, BMI 40-49.9 (morbid obesity) (CMS/[facility])    Pt did have BLANK but refused to use CPAP            Recommended increase activity to 30 minutes of walking daily, most days of the week.    Discussed diet and weight loss with patient.        Patient's Body mass index is 46.92 kg/m². BMI is above normal parameters. Recommendations include: educational material.       No follow-ups on file.    Thank you for allowing me to participate in the care of Sofia Roth. Feel free to contact me directly with any further questions or concerns.          Phani Acevedo MD, FACC  Interventional Cardiology

## 2020-09-24 ENCOUNTER — TELEPHONE (OUTPATIENT)
Dept: CARDIOLOGY | Facility: CLINIC | Age: 75
End: 2020-09-24

## 2020-09-24 NOTE — TELEPHONE ENCOUNTER
Pharmacy called wanting clarifcation on Ravel's Carvedilol. Dr. Rooney had increased this to 12.5mg BID however, sent in 12.5 1/2 tablet BID. Based on BP at visit Dr. Rooney meant to send in the increased dose of 12.5 BID. Pharmacist is aware and will make the correction to RX.

## 2020-12-19 DIAGNOSIS — I10 ESSENTIAL HYPERTENSION: ICD-10-CM

## 2020-12-22 RX ORDER — CARVEDILOL 12.5 MG/1
TABLET ORAL
Qty: 180 TABLET | Refills: 3 | Status: SHIPPED | OUTPATIENT
Start: 2020-12-22

## 2021-02-12 ENCOUNTER — IMMUNIZATION (OUTPATIENT)
Dept: VACCINE CLINIC | Facility: HOSPITAL | Age: 76
End: 2021-02-12

## 2021-02-12 PROCEDURE — 0001A: CPT | Performed by: INTERNAL MEDICINE

## 2021-02-12 PROCEDURE — 91300 HC SARSCOV02 VAC 30MCG/0.3ML IM: CPT | Performed by: INTERNAL MEDICINE

## 2021-03-05 ENCOUNTER — IMMUNIZATION (OUTPATIENT)
Dept: VACCINE CLINIC | Facility: HOSPITAL | Age: 76
End: 2021-03-05

## 2021-03-05 PROCEDURE — 91300 HC SARSCOV02 VAC 30MCG/0.3ML IM: CPT | Performed by: INTERNAL MEDICINE

## 2021-03-05 PROCEDURE — 0002A: CPT | Performed by: INTERNAL MEDICINE

## 2021-03-29 ENCOUNTER — OFFICE VISIT (OUTPATIENT)
Dept: CARDIOLOGY | Facility: CLINIC | Age: 76
End: 2021-03-29

## 2021-03-29 VITALS
BODY MASS INDEX: 44.1 KG/M2 | DIASTOLIC BLOOD PRESSURE: 86 MMHG | WEIGHT: 315 LBS | SYSTOLIC BLOOD PRESSURE: 158 MMHG | HEIGHT: 71 IN | HEART RATE: 76 BPM | OXYGEN SATURATION: 97 %

## 2021-03-29 DIAGNOSIS — E78.2 MIXED HYPERLIPIDEMIA: ICD-10-CM

## 2021-03-29 DIAGNOSIS — Z79.4 TYPE 2 DIABETES MELLITUS WITHOUT COMPLICATION, WITH LONG-TERM CURRENT USE OF INSULIN (HCC): ICD-10-CM

## 2021-03-29 DIAGNOSIS — E11.9 TYPE 2 DIABETES MELLITUS WITHOUT COMPLICATION, WITH LONG-TERM CURRENT USE OF INSULIN (HCC): ICD-10-CM

## 2021-03-29 DIAGNOSIS — I10 ESSENTIAL HYPERTENSION: Primary | ICD-10-CM

## 2021-03-29 DIAGNOSIS — M17.0 ARTHRITIS OF BOTH KNEES: ICD-10-CM

## 2021-03-29 DIAGNOSIS — E66.01 OBESITY, CLASS III, BMI 40-49.9 (MORBID OBESITY) (HCC): ICD-10-CM

## 2021-03-29 DIAGNOSIS — I25.10 CORONARY ARTERY DISEASE INVOLVING NATIVE CORONARY ARTERY OF NATIVE HEART WITHOUT ANGINA PECTORIS: ICD-10-CM

## 2021-03-29 PROCEDURE — 99214 OFFICE O/P EST MOD 30 MIN: CPT | Performed by: INTERNAL MEDICINE

## 2021-03-29 RX ORDER — HYDROCHLOROTHIAZIDE 12.5 MG/1
25 TABLET ORAL DAILY
Qty: 30 TABLET | Refills: 11
Start: 2021-03-29

## 2021-03-29 RX ORDER — ESCITALOPRAM OXALATE 10 MG/1
TABLET ORAL
COMMUNITY
Start: 2021-03-24

## 2021-03-29 NOTE — PROGRESS NOTES
"Chief Complaint  Coronary Artery Disease, Cardiomyopathy, and Hypertension    Subjective    Patient was seen and examined.  He denies any symptoms.  He does have morbid obesity with sleep apnea but he refuses to use a CPAP.  He is predominantly sedentary at home with a normal physical activity such.  His blood pressure was slightly elevated in the office today.  He is supposed to take HCTZ 12.5 daily but he is not sure why he is not taking it.       Sofia Roth presents to [facility] CARDIOLOGY for   History of Present Illness     Objective     Vital Signs:   /86 (BP Location: Right arm, Patient Position: Sitting)   Pulse 76   Ht 180.3 cm (71\")   Wt (!) 156 kg (343 lb)   SpO2 97%   BMI 47.84 kg/m²       Physical Exam  Constitutional:       Appearance: He is well-developed.   HENT:      Head: Normocephalic and atraumatic.   Eyes:      Pupils: Pupils are equal, round, and reactive to light.   Cardiovascular:      Rate and Rhythm: Normal rate and regular rhythm.   Pulmonary:      Effort: Pulmonary effort is normal.      Breath sounds: Normal breath sounds.   Abdominal:      General: Bowel sounds are normal.      Palpations: Abdomen is soft.   Musculoskeletal:         General: Normal range of motion.      Cervical back: Normal range of motion and neck supple.   Skin:     General: Skin is warm and dry.      Capillary Refill: Capillary refill takes less than 2 seconds.   Neurological:      Mental Status: He is alert and oriented to person, place, and time.          Result Review :                         Problem List Items Addressed This Visit        Cardiac and Vasculature    Coronary artery disease involving native coronary artery of native heart without angina pectoris    Essential hypertension - Primary    Relevant Medications    hydroCHLOROthiazide (HYDRODIURIL) 12.5 MG tablet    Mixed hyperlipidemia       Endocrine and Metabolic    Obesity, Class III, BMI 40-49.9 (morbid obesity) " (CMS/[facility])    Type 2 diabetes mellitus without complication, with long-term current use of insulin (CMS/[facility])       Musculoskeletal and Injuries    Arthritis of both knees        Will get labs from his primary care office.  He said his cholesterol and chemistries have been within normal limit.  I have recommended him to start taking hydrochlorothiazide 25 mg daily, he said he does not need prescriptions and he will get it from his PCP office and he still has 3 bottles left.  Follow-up in 6 months.    Follow Up     No follow-ups on file.  Patient was given instructions and counseling regarding his condition or for health maintenance advice. Please see specific information pulled into the AVS if appropriate.               Phani Acevedo MD, [facility]  Interventional Cardiology  03/29/21  15:03 EDT

## 2021-10-04 ENCOUNTER — OFFICE VISIT (OUTPATIENT)
Dept: CARDIOLOGY | Facility: CLINIC | Age: 76
End: 2021-10-04

## 2021-10-04 VITALS
HEIGHT: 71 IN | DIASTOLIC BLOOD PRESSURE: 85 MMHG | OXYGEN SATURATION: 96 % | BODY MASS INDEX: 44.1 KG/M2 | WEIGHT: 315 LBS | SYSTOLIC BLOOD PRESSURE: 146 MMHG | HEART RATE: 92 BPM

## 2021-10-04 DIAGNOSIS — E78.2 MIXED HYPERLIPIDEMIA: ICD-10-CM

## 2021-10-04 DIAGNOSIS — I25.5 ISCHEMIC CARDIOMYOPATHY: ICD-10-CM

## 2021-10-04 DIAGNOSIS — I25.10 CORONARY ARTERY DISEASE INVOLVING NATIVE CORONARY ARTERY OF NATIVE HEART WITHOUT ANGINA PECTORIS: Primary | ICD-10-CM

## 2021-10-04 DIAGNOSIS — E66.01 OBESITY, CLASS III, BMI 40-49.9 (MORBID OBESITY) (HCC): ICD-10-CM

## 2021-10-04 DIAGNOSIS — I10 ESSENTIAL HYPERTENSION: ICD-10-CM

## 2021-10-04 DIAGNOSIS — G47.33 OSA (OBSTRUCTIVE SLEEP APNEA): ICD-10-CM

## 2021-10-04 PROCEDURE — 99214 OFFICE O/P EST MOD 30 MIN: CPT | Performed by: INTERNAL MEDICINE

## 2021-10-04 RX ORDER — GLIMEPIRIDE 2 MG/1
2 TABLET ORAL
COMMUNITY

## 2021-10-04 RX ORDER — VIT C/B6/B5/MAGNESIUM/HERB 173 50-5-6-5MG
CAPSULE ORAL DAILY
COMMUNITY

## 2021-10-04 NOTE — PROGRESS NOTES
"Chief Complaint  Hypertension and Coronary Artery Disease    Subjective      Sofia Roth presents to [facility] CARDIOLOGY for 6 month follow up  History of Present Illness  Patient was seen and examined today for regular follow up.   He denies any chest pain, shortness of breath, dizziness, or palpitations.   He does have morbid obesity states no change since last visit. He has sleep apnea but he refuses to use a CPAP.  He is predominantly sedentary at home with a normal physical activity such.  His blood pressure was slightly elevated in the office today.   He is no complaint with healthcare  The chart, PMH, FMH, social history, PSH, and medications were reviewed today. Problems above addressed this visit.    Objective     Vital Signs:   /85 (BP Location: Right arm, Patient Position: Sitting)   Pulse 92   Ht 180.3 cm (71\")   Wt (!) 156 kg (343 lb)   SpO2 96%   BMI 47.84 kg/m²       Physical Exam  Constitutional:       Appearance: He is well-developed.   HENT:      Head: Normocephalic and atraumatic.   Eyes:      Pupils: Pupils are equal, round, and reactive to light.   Cardiovascular:      Rate and Rhythm: Normal rate and regular rhythm.   Pulmonary:      Effort: Pulmonary effort is normal.      Breath sounds: Normal breath sounds.   Abdominal:      General: Bowel sounds are normal.      Palpations: Abdomen is soft.   Musculoskeletal:         General: Normal range of motion.      Cervical back: Normal range of motion and neck supple.   Skin:     General: Skin is warm and dry.      Capillary Refill: Capillary refill takes less than 2 seconds.   Neurological:      Mental Status: He is alert and oriented to person, place, and time.          Result Review : Cardiology studies 10-4-2021  ASSESSMENT/PLAN  Essential hypertension. States is has been normal at home.  Dyslipidemia states it is within normal limits  ASCVD  Uncontrolled non-insulin DM2  BLANK with out use of CPAP      Keep a log " of BP and call the office   Will get labs from his primary care office.  Echocardiogram prior to next visit.   Follow-up in 6 months.      Problem List Items Addressed This Visit        Cardiac and Vasculature    Coronary artery disease involving native coronary artery of native heart without angina pectoris - Primary    Ischemic cardiomyopathy    Essential hypertension    Mixed hyperlipidemia       Endocrine and Metabolic    Obesity, Class III, BMI 40-49.9 (morbid obesity) (HCC)      Other Visit Diagnoses     BLANK (obstructive sleep apnea)            Follow Up     Return in about 6 months (around 4/4/2022).  Patient was given instructions and counseling regarding his condition or for health maintenance advice. Please see specific information pulled into the AVS if appropriate.               Phani Acevedo MD, FACC  Interventional Cardiology    ADDIE Ferreira, acting as scribe for Phani Aecvedo MD, [facility]    10/04/21  11:45 EDT

## 2021-10-13 ENCOUNTER — HOSPITAL ENCOUNTER (OUTPATIENT)
Dept: CARDIOLOGY | Facility: HOSPITAL | Age: 76
Discharge: HOME | End: 2021-10-13
Admitting: INTERNAL MEDICINE

## 2021-10-13 DIAGNOSIS — I25.10 CORONARY ARTERY DISEASE INVOLVING NATIVE CORONARY ARTERY OF NATIVE HEART WITHOUT ANGINA PECTORIS: ICD-10-CM

## 2021-10-13 DIAGNOSIS — I10 ESSENTIAL HYPERTENSION: ICD-10-CM

## 2021-10-13 DIAGNOSIS — E78.2 MIXED HYPERLIPIDEMIA: ICD-10-CM

## 2021-10-13 DIAGNOSIS — E66.01 OBESITY, CLASS III, BMI 40-49.9 (MORBID OBESITY) (HCC): ICD-10-CM

## 2021-10-13 DIAGNOSIS — G47.33 OSA (OBSTRUCTIVE SLEEP APNEA): ICD-10-CM

## 2021-10-13 DIAGNOSIS — I25.5 ISCHEMIC CARDIOMYOPATHY: ICD-10-CM

## 2021-10-13 PROCEDURE — 93306 TTE W/DOPPLER COMPLETE: CPT | Performed by: INTERNAL MEDICINE

## 2021-10-13 PROCEDURE — 93306 TTE W/DOPPLER COMPLETE: CPT

## 2021-10-18 LAB
BH CV ECHO MEAS - % IVS THICK: 22.8 %
BH CV ECHO MEAS - % LVPW THICK: 23.6 %
BH CV ECHO MEAS - ACS: 1.8 CM
BH CV ECHO MEAS - AO MAX PG: 7.4 MMHG
BH CV ECHO MEAS - AO MEAN PG: 3 MMHG
BH CV ECHO MEAS - AO ROOT AREA (BSA CORRECTED): 1.1
BH CV ECHO MEAS - AO ROOT AREA: 7.3 CM^2
BH CV ECHO MEAS - AO ROOT DIAM: 3.1 CM
BH CV ECHO MEAS - AO V2 MAX: 136 CM/SEC
BH CV ECHO MEAS - AO V2 MEAN: 84.7 CM/SEC
BH CV ECHO MEAS - AO V2 VTI: 27 CM
BH CV ECHO MEAS - BSA(HAYCOCK): 2.9 M^2
BH CV ECHO MEAS - BSA: 2.7 M^2
BH CV ECHO MEAS - BZI_BMI: 47.8 KILOGRAMS/M^2
BH CV ECHO MEAS - BZI_METRIC_HEIGHT: 180.3 CM
BH CV ECHO MEAS - BZI_METRIC_WEIGHT: 155.6 KG
BH CV ECHO MEAS - EDV(CUBED): 163.7 ML
BH CV ECHO MEAS - EDV(MOD-SP4): 83.5 ML
BH CV ECHO MEAS - EDV(TEICH): 145.6 ML
BH CV ECHO MEAS - EF(CUBED): 63.2 %
BH CV ECHO MEAS - EF(MOD-SP4): 62.6 %
BH CV ECHO MEAS - EF(TEICH): 54.2 %
BH CV ECHO MEAS - ESV(CUBED): 60.2 ML
BH CV ECHO MEAS - ESV(MOD-SP4): 31.2 ML
BH CV ECHO MEAS - ESV(TEICH): 66.7 ML
BH CV ECHO MEAS - FS: 28.3 %
BH CV ECHO MEAS - IVS/LVPW: 0.92
BH CV ECHO MEAS - IVSD: 1.2 CM
BH CV ECHO MEAS - IVSS: 1.5 CM
BH CV ECHO MEAS - LA DIMENSION: 3.7 CM
BH CV ECHO MEAS - LA/AO: 1.2
BH CV ECHO MEAS - LV DIASTOLIC VOL/BSA (35-75): 31.5 ML/M^2
BH CV ECHO MEAS - LV MASS(C)D: 296 GRAMS
BH CV ECHO MEAS - LV MASS(C)DI: 111.6 GRAMS/M^2
BH CV ECHO MEAS - LV MASS(C)S: 245.8 GRAMS
BH CV ECHO MEAS - LV MASS(C)SI: 92.7 GRAMS/M^2
BH CV ECHO MEAS - LV SYSTOLIC VOL/BSA (12-30): 11.8 ML/M^2
BH CV ECHO MEAS - LVIDD: 5.5 CM
BH CV ECHO MEAS - LVIDS: 3.9 CM
BH CV ECHO MEAS - LVLD AP4: 8.5 CM
BH CV ECHO MEAS - LVLS AP4: 7.7 CM
BH CV ECHO MEAS - LVOT AREA (M): 4.5 CM^2
BH CV ECHO MEAS - LVOT AREA: 4.5 CM^2
BH CV ECHO MEAS - LVOT DIAM: 2.4 CM
BH CV ECHO MEAS - LVPWD: 1.3 CM
BH CV ECHO MEAS - LVPWS: 1.7 CM
BH CV ECHO MEAS - MV A MAX VEL: 92 CM/SEC
BH CV ECHO MEAS - MV E MAX VEL: 56.6 CM/SEC
BH CV ECHO MEAS - MV E/A: 0.62
BH CV ECHO MEAS - PA ACC TIME: 0.09 SEC
BH CV ECHO MEAS - PA PR(ACCEL): 40.8 MMHG
BH CV ECHO MEAS - RAP SYSTOLE: 10 MMHG
BH CV ECHO MEAS - RVSP: 33.2 MMHG
BH CV ECHO MEAS - SI(AO): 74.4 ML/M^2
BH CV ECHO MEAS - SI(CUBED): 39 ML/M^2
BH CV ECHO MEAS - SI(MOD-SP4): 19.7 ML/M^2
BH CV ECHO MEAS - SI(TEICH): 29.7 ML/M^2
BH CV ECHO MEAS - SV(AO): 197.3 ML
BH CV ECHO MEAS - SV(CUBED): 103.4 ML
BH CV ECHO MEAS - SV(MOD-SP4): 52.3 ML
BH CV ECHO MEAS - SV(TEICH): 78.9 ML
BH CV ECHO MEAS - TR MAX VEL: 241 CM/SEC
MAXIMAL PREDICTED HEART RATE: 145 BPM
STRESS TARGET HR: 123 BPM

## 2021-10-25 ENCOUNTER — TELEPHONE (OUTPATIENT)
Dept: CARDIOLOGY | Facility: CLINIC | Age: 76
End: 2021-10-25

## 2021-10-25 NOTE — TELEPHONE ENCOUNTER
----- Message from Phani Acevedo MD sent at 10/21/2021  4:21 PM EDT -----  Please call patient to and tell him his test was normal.   Thanks.

## 2021-12-03 ENCOUNTER — TRANSCRIBE ORDERS (OUTPATIENT)
Dept: ADMINISTRATIVE | Facility: HOSPITAL | Age: 76
End: 2021-12-03

## 2021-12-03 DIAGNOSIS — R74.8 ELEVATED LIVER ENZYMES: Primary | ICD-10-CM

## 2021-12-23 ENCOUNTER — HOSPITAL ENCOUNTER (OUTPATIENT)
Dept: ULTRASOUND IMAGING | Facility: HOSPITAL | Age: 76
Discharge: HOME | End: 2021-12-23
Admitting: FAMILY MEDICINE

## 2021-12-23 DIAGNOSIS — R74.8 ELEVATED LIVER ENZYMES: ICD-10-CM

## 2021-12-23 PROCEDURE — 76705 ECHO EXAM OF ABDOMEN: CPT | Performed by: RADIOLOGY

## 2021-12-23 PROCEDURE — 76705 ECHO EXAM OF ABDOMEN: CPT

## 2022-05-02 ENCOUNTER — TELEPHONE (OUTPATIENT)
Dept: ORTHOPEDIC SURGERY | Facility: CLINIC | Age: 77
End: 2022-05-02

## 2022-05-02 NOTE — TELEPHONE ENCOUNTER
Caller: Sofia Roth    Relationship to patient: Self    Best call back number: [phone]    Chief complaint: BILATERAL KNEES    Type of visit: INJECTION - GEL    Requested date: SOON    If rescheduling, when is the original appointment: N/A    Additional notes: PLEASE CALL PATIENT TO SCHEDULE. TY

## 2022-06-15 ENCOUNTER — OFFICE VISIT (OUTPATIENT)
Dept: ORTHOPEDIC SURGERY | Facility: CLINIC | Age: 77
End: 2022-06-15

## 2022-06-15 VITALS — WEIGHT: 315 LBS | HEIGHT: 71 IN | BODY MASS INDEX: 44.1 KG/M2

## 2022-06-15 DIAGNOSIS — M17.0 PRIMARY OSTEOARTHRITIS OF BOTH KNEES: Primary | ICD-10-CM

## 2022-06-15 PROCEDURE — 20610 DRAIN/INJ JOINT/BURSA W/O US: CPT | Performed by: ORTHOPAEDIC SURGERY

## 2022-06-15 RX ORDER — EMPAGLIFLOZIN 25 MG/1
25 TABLET, FILM COATED ORAL EVERY MORNING
COMMUNITY
Start: 2022-05-21

## 2022-06-15 RX ORDER — GLIMEPIRIDE 4 MG/1
4 TABLET ORAL DAILY
COMMUNITY
Start: 2022-05-31

## 2022-06-15 RX ADMIN — LIDOCAINE HYDROCHLORIDE 5 ML: 10 INJECTION, SOLUTION EPIDURAL; INFILTRATION; INTRACAUDAL; PERINEURAL at 13:00

## 2022-06-15 NOTE — PROGRESS NOTES
Follow-up Visit         Patient: Sofia Roth  YOB: 1945  Date of Encounter: 06/15/2022      Chief  Complaint:   Chief Complaint   Patient presents with   • Left Knee - Osteoarthritis, Follow-up   • Right Knee - Osteoarthritis, Follow-up         HPI:  Sofia Roth, 77 y.o. male presents in follow-up bilateral knee pain bilateral knee osteoarthritis he was last seen June 2020.  Was provided steroid injection right knee and viscosupplementation left knee.  He reports he received some relief from both.  Visited the arthritis clinic in [place] in September 2021 and received series of injections.  He reported moderate improvement.  He is here today requesting viscous injections bilateral knees.        Medical History:  Patient Active Problem List   Diagnosis   • Coronary artery disease involving native coronary artery of native heart without angina pectoris   • Obesity, Class III, BMI 40-49.9 (morbid obesity) ([facility])   • Ischemic cardiomyopathy   • Essential hypertension   • Mixed hyperlipidemia   • Arthritis of both knees   • Type 2 diabetes mellitus without complication, with long-term current use of insulin ([facility])     Past Medical History:   Diagnosis Date   • Arthritis    • Coronary artery disease    • Diabetes mellitus ([facility])    • Essential hypertension 8/7/2017         Social History:  Social History     Socioeconomic History   • Marital status:    Tobacco Use   • Smoking status: Former Smoker     Packs/day: 1.00     Years: 15.00     Pack years: 15.00     Types: Cigarettes, Pipe, Cigars   • Smokeless tobacco: Former User     Types: Chew   Vaping Use   • Vaping Use: Never used   Substance and Sexual Activity   • Alcohol use: No   • Drug use: No   • Sexual activity: Defer         Current Medications:    Current Outpatient Medications:   •  aspirin 81 MG EC tablet, Take 1 tablet by mouth Daily., Disp: 30 tablet, Rfl: 11  •  atorvastatin (LIPITOR) 40 MG tablet, TAKE 1 TABLET BY MOUTH ONCE  DAILY, Disp: 90 tablet, Rfl: 3  •  carvedilol (COREG) 12.5 MG tablet, TAKE ONE TABLET BY MOUTH TWICE A DAY WITH A MEAL, Disp: 180 tablet, Rfl: 3  •  clopidogrel (PLAVIX) 75 MG tablet, Take 1 tablet by mouth Daily., Disp: 30 tablet, Rfl: 11  •  escitalopram (LEXAPRO) 10 MG tablet, , Disp: , Rfl:   •  glimepiride (AMARYL) 2 MG tablet, Take 2 mg by mouth Every Morning Before Breakfast., Disp: , Rfl:   •  glimepiride (AMARYL) 4 MG tablet, Take 4 mg by mouth Daily., Disp: , Rfl:   •  hydroCHLOROthiazide (HYDRODIURIL) 12.5 MG tablet, Take 2 tablets by mouth Daily., Disp: 30 tablet, Rfl: 11  •  Jardiance 25 MG tablet tablet, Take 25 mg by mouth Every Morning., Disp: , Rfl:   •  lisinopril (PRINIVIL,ZESTRIL) 10 MG tablet, Take 1 tablet by mouth Daily., Disp: 30 tablet, Rfl: 11  •  metFORMIN (GLUCOPHAGE) 500 MG tablet, Take 500 mg by mouth 2 (Two) Times a Day With Meals., Disp: , Rfl:   •  omeprazole (priLOSEC) 20 MG capsule, Take 20 mg by mouth Daily., Disp: , Rfl:   •  Turmeric 500 MG capsule, Take  by mouth Daily., Disp: , Rfl:       Allergies:  No Known Allergies      Family History:  Family History   Problem Relation Age of Onset   • Cancer Mother    • Early death Mother    • Heart disease Father    • Diabetes Sister    • Osteoarthritis Sister    • Osteoporosis Sister    • Heart disease Sister    • Heart disease Paternal Uncle    • Diabetes Maternal Grandfather          Surgical History:  Past Surgical History:   Procedure Laterality Date   • BACK SURGERY     • CARDIAC CATHETERIZATION N/A 6/15/2017    Procedure: Left Heart Cath;  Surgeon: Josué Mcneill MD;  Location:  [facility] CATH INVASIVE LOCATION;  Service:    • CARDIAC CATHETERIZATION N/A 6/15/2017    Procedure: Left Heart Cath;  Surgeon: Josué Mcneill MD;  Location:  [facility] CATH INVASIVE LOCATION;  Service:    • HERNIA REPAIR     • DC RT/LT HEART CATHETERS N/A 6/15/2017    Procedure: Percutaneous Coronary Intervention;  Surgeon: Josué Mcneill MD;  Location:  [facility] INVASIVE LOCATION;  Service: Cardiology   • TONSILLECTOMY     • VASECTOMY           Radiology:   Radiographs lateral knees reviewed from previous visit show moderate tricompartmental osteoarthritis.      Examination:   Examination bilateral knees reveals full extension with minimal effusion and no gross instability.        Assessment & Plan:   77 y.o. male presents with moderate osteoarthritis bilateral knees with good response to viscous injections in the past today he is provided Monovisc intra-articular with lidocaine block bilateral knees he will follow-up as needed.         Diagnosis Plan   1. Primary osteoarthritis of both knees           Large Joint Arthrocentesis: R knee  Date/Time: 6/15/2022 1:00 PM  Consent given by: patient  Site marked: site marked  Timeout: Immediately prior to procedure a time out was called to verify the correct patient, procedure, equipment, support staff and site/side marked as required   Supporting Documentation  Indications: pain   Procedure Details  Location: knee - R knee  Preparation: Patient was prepped and draped in the usual sterile fashion  Needle size: 20 G  Approach: anterolateral  Medications administered: 88 mg Hyaluronan 88 MG/4ML; 5 mL lidocaine PF 1% 1 %  Patient tolerance: patient tolerated the procedure well with no immediate complications    Large Joint Arthrocentesis: L knee  Date/Time: 6/15/2022 1:00 PM  Consent given by: patient  Site marked: site marked  Timeout: Immediately prior to procedure a time out was called to verify the correct patient, procedure, equipment, support staff and site/side marked as required   Supporting Documentation  Indications: pain   Procedure Details  Location: knee - L knee  Preparation: Patient was prepped and draped in the usual sterile fashion  Needle size: 20 G  Approach: anterolateral  Medications administered: 88 mg Hyaluronan 88 MG/4ML; 5 mL lidocaine PF 1% 1 %  Patient tolerance: patient tolerated the procedure  well with no immediate complications              Cc:  Bk Adams, DO              This document has been electronically signed by Jermain Simental MD   June 16, 2022 12:59 EDT

## 2022-06-29 ENCOUNTER — OFFICE VISIT (OUTPATIENT)
Dept: CARDIOLOGY | Facility: CLINIC | Age: 77
End: 2022-06-29

## 2022-06-29 VITALS
WEIGHT: 315 LBS | BODY MASS INDEX: 44.1 KG/M2 | HEIGHT: 71 IN | SYSTOLIC BLOOD PRESSURE: 124 MMHG | DIASTOLIC BLOOD PRESSURE: 79 MMHG | OXYGEN SATURATION: 98 % | HEART RATE: 80 BPM

## 2022-06-29 DIAGNOSIS — I25.5 ISCHEMIC CARDIOMYOPATHY: ICD-10-CM

## 2022-06-29 DIAGNOSIS — E78.2 MIXED HYPERLIPIDEMIA: ICD-10-CM

## 2022-06-29 DIAGNOSIS — I10 ESSENTIAL HYPERTENSION: ICD-10-CM

## 2022-06-29 DIAGNOSIS — I25.10 CORONARY ARTERY DISEASE INVOLVING NATIVE CORONARY ARTERY OF NATIVE HEART WITHOUT ANGINA PECTORIS: Primary | ICD-10-CM

## 2022-06-29 PROCEDURE — 99213 OFFICE O/P EST LOW 20 MIN: CPT | Performed by: INTERNAL MEDICINE

## 2022-06-29 RX ORDER — LIDOCAINE HYDROCHLORIDE 10 MG/ML
5 INJECTION, SOLUTION EPIDURAL; INFILTRATION; INTRACAUDAL; PERINEURAL
Status: COMPLETED | OUTPATIENT
Start: 2022-06-15 | End: 2022-06-15

## 2022-06-29 RX ORDER — VENLAFAXINE 37.5 MG/1
37.5 TABLET ORAL 2 TIMES DAILY
COMMUNITY

## 2022-06-29 RX ORDER — ARIPIPRAZOLE 5 MG/1
5 TABLET ORAL DAILY
COMMUNITY

## 2022-10-11 ENCOUNTER — TRANSCRIBE ORDERS (OUTPATIENT)
Dept: ADMINISTRATIVE | Facility: HOSPITAL | Age: 77
End: 2022-10-11

## 2022-10-11 DIAGNOSIS — N18.9 CHRONIC KIDNEY DISEASE, UNSPECIFIED CKD STAGE: Primary | ICD-10-CM

## 2022-11-14 ENCOUNTER — HOSPITAL ENCOUNTER (OUTPATIENT)
Dept: ULTRASOUND IMAGING | Facility: HOSPITAL | Age: 77
Discharge: HOME | End: 2022-11-14
Admitting: INTERNAL MEDICINE

## 2022-11-14 DIAGNOSIS — N18.9 CHRONIC KIDNEY DISEASE, UNSPECIFIED CKD STAGE: ICD-10-CM

## 2022-11-14 PROCEDURE — 76775 US EXAM ABDO BACK WALL LIM: CPT

## 2022-11-14 PROCEDURE — 76775 US EXAM ABDO BACK WALL LIM: CPT | Performed by: RADIOLOGY

## 2022-12-29 ENCOUNTER — OFFICE VISIT (OUTPATIENT)
Dept: CARDIOLOGY | Facility: CLINIC | Age: 77
End: 2022-12-29
Payer: MEDICARE

## 2022-12-29 VITALS
HEART RATE: 81 BPM | DIASTOLIC BLOOD PRESSURE: 86 MMHG | SYSTOLIC BLOOD PRESSURE: 139 MMHG | HEIGHT: 71 IN | WEIGHT: 315 LBS | OXYGEN SATURATION: 95 % | BODY MASS INDEX: 44.1 KG/M2

## 2022-12-29 DIAGNOSIS — I25.10 CORONARY ARTERY DISEASE INVOLVING NATIVE CORONARY ARTERY OF NATIVE HEART WITHOUT ANGINA PECTORIS: Primary | ICD-10-CM

## 2022-12-29 DIAGNOSIS — I10 ESSENTIAL HYPERTENSION: ICD-10-CM

## 2022-12-29 DIAGNOSIS — E78.2 MIXED HYPERLIPIDEMIA: ICD-10-CM

## 2022-12-29 DIAGNOSIS — I25.5 ISCHEMIC CARDIOMYOPATHY: ICD-10-CM

## 2022-12-29 PROCEDURE — 99214 OFFICE O/P EST MOD 30 MIN: CPT | Performed by: INTERNAL MEDICINE

## 2022-12-29 PROCEDURE — 1160F RVW MEDS BY RX/DR IN RCRD: CPT | Performed by: INTERNAL MEDICINE

## 2022-12-29 PROCEDURE — 1159F MED LIST DOCD IN RCRD: CPT | Performed by: INTERNAL MEDICINE

## 2022-12-29 RX ORDER — DULAGLUTIDE 3 MG/.5ML
3 INJECTION, SOLUTION SUBCUTANEOUS WEEKLY
COMMUNITY

## 2022-12-29 NOTE — PROGRESS NOTES
[facility] CARDIOLOGY  [address] [place] [address]  Phone: [phone]  Fax: [phone]    12/29/2022    Chief Complaint   Patient presents with   • Coronary Artery Disease     Patient here for follow up / states short of breath some, denies any chest pain , denies swelling to lower extremities         History:     Sofia Roth is a 77 y.o. male presenting for  follow-up evaluation   Clinically stable from cardiac standpoint   Symptoms:  CP no  SOB stable    Orthopnea No  Lower extremity edema no   Palpitations no   Compliant with medications yes  Claudication no      Past Medical History:   Diagnosis Date   • Arthritis    • Coronary artery disease    • Diabetes mellitus (HCC)    • Essential hypertension 8/7/2017       Past Surgical History:   Procedure Laterality Date   • BACK SURGERY     • CARDIAC CATHETERIZATION N/A 6/15/2017    Procedure: Left Heart Cath;  Surgeon: Josué Mcneill MD;  Location:  [facility] CATH INVASIVE LOCATION;  Service:    • CARDIAC CATHETERIZATION N/A 6/15/2017    Procedure: Left Heart Cath;  Surgeon: Josué Mcneill MD;  Location: [facility] CATH INVASIVE LOCATION;  Service:    • HERNIA REPAIR     • [place] RT/LT HEART CATHETERS N/A 6/15/2017    Procedure: Percutaneous Coronary Intervention;  Surgeon: Josué Mcneill MD;  Location:  [facility] CATH INVASIVE LOCATION;  Service: Cardiology   • TONSILLECTOMY     • VASECTOMY          Past Social History:  Social History     Socioeconomic History   • Marital status:    Tobacco Use   • Smoking status: Former     Packs/day: 1.00     Years: 15.00     Pack years: 15.00     Types: Cigarettes, Pipe, Cigars   • Smokeless tobacco: Former     Types: Chew   Vaping Use   • Vaping Use: Never used   Substance and Sexual Activity   • Alcohol use: No   • Drug use: No   • Sexual activity: Defer       Past Family History:  Family History   Problem Relation Age of Onset   • Cancer Mother    • Early death Mother    • Heart  disease Father    • Diabetes Sister    • Osteoarthritis Sister    • Osteoporosis Sister    • Heart disease Sister    • Heart disease Paternal Uncle    • Diabetes Maternal Grandfather        Review of Systems:   ROS       Current Outpatient Medications   Medication Sig Dispense Refill   • aspirin 81 MG EC tablet Take 1 tablet by mouth Daily. 30 tablet 11   • atorvastatin (LIPITOR) 40 MG tablet TAKE 1 TABLET BY MOUTH ONCE DAILY 90 tablet 3   • carvedilol (COREG) 12.5 MG tablet TAKE ONE TABLET BY MOUTH TWICE A DAY WITH A MEAL (Patient taking differently: 6.25 mg.) 180 tablet 3   • Dulaglutide (Trulicity) 3 MG/0.5ML solution pen-injector Inject 3 mg under the skin into the appropriate area as directed 1 (One) Time Per Week.     • glimepiride (AMARYL) 4 MG tablet Take 4 mg by mouth Daily.     • lisinopril (PRINIVIL,ZESTRIL) 10 MG tablet Take 1 tablet by mouth Daily. (Patient taking differently: Take 5 mg by mouth Daily.) 30 tablet 11   • metFORMIN (GLUCOPHAGE) 500 MG tablet Take 500 mg by mouth 2 (Two) Times a Day With Meals.     • omeprazole (priLOSEC) 20 MG capsule Take 20 mg by mouth Daily.     • Turmeric 500 MG capsule Take  by mouth Daily.     • ARIPiprazole (ABILIFY) 5 MG tablet Take 5 mg by mouth Daily.     • clopidogrel (PLAVIX) 75 MG tablet Take 1 tablet by mouth Daily. 30 tablet 11   • escitalopram (LEXAPRO) 10 MG tablet      • glimepiride (AMARYL) 2 MG tablet Take 2 mg by mouth Every Morning Before Breakfast.     • hydroCHLOROthiazide (HYDRODIURIL) 12.5 MG tablet Take 2 tablets by mouth Daily. 30 tablet 11   • Jardiance 25 MG tablet tablet Take 25 mg by mouth Every Morning.     • venlafaxine (EFFEXOR) 37.5 MG tablet Take 37.5 mg by mouth 2 (Two) Times a Day.       No current facility-administered medications for this visit.        No Known Allergies    Objective     /86 (BP Location: Left arm, Patient Position: Sitting, Cuff Size: Large Adult)   Pulse 81   Ht 180.3 cm (71\")   Wt (!) 152 kg (336 lb 3.2  oz)   SpO2 95%   BMI 46.89 kg/m²     Physical Exam       DATA:  Results for orders placed during the hospital encounter of 06/15/17    SCANNED - TELEMETRY     Results for orders placed during the hospital encounter of 10/13/21    Adult Transthoracic Echo Complete W/ Cont if Necessary Per Protocol    Interpretation Summary  · Left ventricular ejection fraction appears to be 56 - 60%. Left ventricular systolic function is normal.  · Left ventricular diastolic function is consistent with (grade I) impaired relaxation.  · No significant functional valvular abnormalities noted  · There is no evidence of pericardial effusion.     Results for orders placed during the hospital encounter of 06/15/17    Cardiac Catheterization/Vascular Study    Narrative  · Right dominant coronary artery system with a patent stent in the proximal LAD    Final impression:  Right dominant coronary artery system without hemodynamically significant coronary artery disease and widely patent stent in the proximal LAD.    Procedures performed:  Coronary angiography    History of present illness:  Mr. Roth is a pleasant 72-year-old gentleman who resented to the hospital earlier today with an ST elevation myocardial infarction in the anterior territory.  He underwent drug-eluting stent implantation to the proximal LAD without complication.  He had recurrent chest discomfort postprocedure.  As such, he was brought back to the cardiac catheterization laboratory.  Prior to the procedure the patient was given informed consent apprising him of the risk of heart attack, stroke and death.  The patient understood the risks and agreed to proceed.    Procedure in detail:  The patient was brought to the cardiac catheterization laboratory and prepped and draped in the usual sterile fashion.  Adequate anesthesia was obtained by infiltrating the right groin with local lidocaine.  The prior sheath was exchanged over wire and a 5 [place] JL4 catheter was used to  engage the ostium of the left main coronary artery.  Angiography was performed in varying views using hand injection of contrast material.  At the conclusion of the procedure the patient was returned to the floor without evidence of complication.  Sheath was pulled using manual compression.    Angiographic findings in detail:  Left Main coronary artery:  Left main coronary artery is a large vessel which bifurcates into the LAD and circumflex arteries.  The left main coronary artery is free of atherosclerotic disease.    Left anterior descending artery:  Left anterior descending artery is a large vessel which reaches beyond the apex the ventricle and gives rise to 2 moderate-sized diagonal branches.  There is a previously implanted stent in the proximal LAD which is widely patent without evidence of in-stent thrombosis or itch dissection.  There is GWENDOLYN-3 flow in the distal vascular bed.  The first diagonal branch is noted to have a 60% lesion in its ostium and its midsegment.    Circumflex artery:  The circumflex artery is a large vessel which gives rise to 3 obtuse marginal branches the first 2 of which are large and the third of which is small.  There is a 30% lesion in the midportion circumflex artery and a 30% lesion noted in the proximal portion of the first obtuse marginal branch.    Final impression and plan:  Overall it is my impression that Mr. Roth has undergone successful percutaneous revascularization as previously described.  His stent is widely patent without evidence of thrombosis.  He will undergo typical management post myocardial infarction.    Procedures     Lab Results   Component Value Date    CHOL 171 06/16/2017    CHOL 235 (H) 06/15/2017     Lab Results   Component Value Date    TRIG 104 06/16/2017    TRIG 171 (H) 06/15/2017     Lab Results   Component Value Date    HDL 31 (L) 06/16/2017    HDL 42 (L) 06/15/2017     Lab Results   Component Value Date     (H) 06/16/2017     (H)  06/15/2017       Lab Results   Component Value Date    TSH 1.797 06/16/2017               Invalid input(s): LABALBU, PROT        Assessment and Plan     Diagnosis Plan   1. Coronary artery disease involving native coronary artery of native heart without angina pectoris  Clinically stable, cont with current meds      2. Essential hypertension        3. Ischemic cardiomyopathy        4. Mixed hyperlipidemia                Recommended increase activity to 30 minutes of walking daily, most days of the week    Thank you for allowing me to participate in the care of Sofia Roth. Feel free to contact me directly with any further questions or concerns.          Phani Acevedo MD, FACC  Interventional Cardiology

## 2024-01-01 NOTE — PROGRESS NOTES
Problem: Discharge Planning  Goal: Discharge to home or other facility with appropriate resources  Outcome: Progressing     Problem: Safety - Adult  Goal: Free from fall injury  Outcome: Progressing     Problem: ABCDS Injury Assessment  Goal: Absence of physical injury  Outcome: Progressing     Problem: Skin/Tissue Integrity  Goal: Absence of new skin breakdown  Description: 1.  Monitor for areas of redness and/or skin breakdown  2.  Assess vascular access sites hourly  3.  Every 4-6 hours minimum:  Change oxygen saturation probe site  4.  Every 4-6 hours:  If on nasal continuous positive airway pressure, respiratory therapy assess nares and determine need for appliance change or resting period.  Outcome: Progressing     Problem: Pain  Goal: Verbalizes/displays adequate comfort level or baseline comfort level  Outcome: Progressing     Problem: Nutrition Deficit:  Goal: Optimize nutritional status  Outcome: Progressing      Pt. Attended Phase III CardiacRehab. See flow sheet for details. NAD noted, no c/o's voiced

## 2024-01-12 NOTE — PROGRESS NOTES
Pt attended phase III session as scheduled.  NAD note, skin warm, pink and dry, denies chest pain, chest pressure , SOA, tolerated exercise well. V/S WIDL See exercise flow  for exercise data      .

## 2024-01-23 ENCOUNTER — OFFICE VISIT (OUTPATIENT)
Dept: CARDIOLOGY | Facility: CLINIC | Age: 79
End: 2024-01-23
Payer: MEDICARE

## 2024-01-23 VITALS
OXYGEN SATURATION: 98 % | HEART RATE: 89 BPM | BODY MASS INDEX: 44.1 KG/M2 | HEIGHT: 71 IN | DIASTOLIC BLOOD PRESSURE: 74 MMHG | SYSTOLIC BLOOD PRESSURE: 117 MMHG | WEIGHT: 315 LBS

## 2024-01-23 DIAGNOSIS — I10 ESSENTIAL HYPERTENSION: Primary | ICD-10-CM

## 2024-01-23 DIAGNOSIS — E78.2 MIXED HYPERLIPIDEMIA: ICD-10-CM

## 2024-01-23 DIAGNOSIS — I25.10 CORONARY ARTERY DISEASE INVOLVING NATIVE CORONARY ARTERY OF NATIVE HEART WITHOUT ANGINA PECTORIS: ICD-10-CM

## 2024-01-23 DIAGNOSIS — I25.5 ISCHEMIC CARDIOMYOPATHY: ICD-10-CM

## 2024-01-23 PROCEDURE — 99213 OFFICE O/P EST LOW 20 MIN: CPT | Performed by: INTERNAL MEDICINE

## 2024-01-23 PROCEDURE — 3074F SYST BP LT 130 MM HG: CPT | Performed by: INTERNAL MEDICINE

## 2024-01-23 PROCEDURE — 3078F DIAST BP <80 MM HG: CPT | Performed by: INTERNAL MEDICINE

## 2024-03-10 NOTE — PROGRESS NOTES
[facility] CARDIOLOGY  [address]  [place] [address]  Phone: [phone]  Fax: [phone]    01/23/2024    Chief Complaint   Patient presents with    Shortness of Breath     Patient has CAD, stents, has shortness of breath , denies chest pain today           History:     Sofia Roth is a 78 y.o. male presenting for  follow-up evaluation   Clinically stable from cardiac standpoint   Symptoms:  CP no  SOB no    Orthopnea No  Lower extremity edema no   Palpitations stable   Compliant with medications no  Claudication no      Past Medical History:   Diagnosis Date    Arthritis     Coronary artery disease     Diabetes mellitus     Essential hypertension 8/7/2017       Past Surgical History:   Procedure Laterality Date    BACK SURGERY      CARDIAC CATHETERIZATION N/A 6/15/2017    Procedure: Left Heart Cath;  Surgeon: Josué Mcneill MD;  Location:  [facility] CATH INVASIVE LOCATION;  Service:     CARDIAC CATHETERIZATION N/A 6/15/2017    Procedure: Left Heart Cath;  Surgeon: Josué Mcneill MD;  Location: [facility] CATH INVASIVE LOCATION;  Service:     HERNIA REPAIR      MA RT/LT HEART CATHETERS N/A 6/15/2017    Procedure: Percutaneous Coronary Intervention;  Surgeon: Josué Mcneill MD;  Location: [facility] CATH INVASIVE LOCATION;  Service: Cardiology    TONSILLECTOMY      VASECTOMY          Past Social History:  Social History     Socioeconomic History    Marital status:    Tobacco Use    Smoking status: Former     Current packs/day: 1.00     Average packs/day: 1 pack/day for 15.0 years (15.0 ttl pk-yrs)     Types: Cigarettes, Pipe, Cigars    Smokeless tobacco: Former     Types: Chew   Vaping Use    Vaping status: Never Used   Substance and Sexual Activity    Alcohol use: No    Drug use: No    Sexual activity: Defer       Past Family History:  Family History   Problem Relation Age of Onset    Cancer Mother     Early death Mother     Heart disease Father     Diabetes Sister      Osteoarthritis Sister     Osteoporosis Sister     Heart disease Sister     Heart disease Paternal Uncle     Diabetes Maternal Grandfather        Review of Systems:   ROS       Current Outpatient Medications   Medication Sig Dispense Refill    aspirin 81 MG EC tablet Take 1 tablet by mouth Daily. 30 tablet 11    atorvastatin (LIPITOR) 40 MG tablet TAKE 1 TABLET BY MOUTH ONCE DAILY 90 tablet 3    carvedilol (COREG) 12.5 MG tablet TAKE ONE TABLET BY MOUTH TWICE A DAY WITH A MEAL (Patient taking differently: 0.5 tablets.) 180 tablet 3    clopidogrel (PLAVIX) 75 MG tablet Take 1 tablet by mouth Daily. 30 tablet 11    Dulaglutide 1.5 MG/0.5ML solution pen-injector Inject 3 mg under the skin into the appropriate area as directed 1 (One) Time Per Week.      escitalopram (LEXAPRO) 10 MG tablet 0.5 tablets.      glimepiride (AMARYL) 4 MG tablet Take 1 tablet by mouth Daily.      Jardiance 25 MG tablet tablet Take 1 tablet by mouth Every Morning.      lisinopril (PRINIVIL,ZESTRIL) 10 MG tablet Take 1 tablet by mouth Daily. (Patient taking differently: Take 0.5 tablets by mouth Daily.) 30 tablet 11    omeprazole (priLOSEC) 20 MG capsule Take 1 capsule by mouth Daily.      ARIPiprazole (ABILIFY) 5 MG tablet Take 5 mg by mouth Daily. (Patient not taking: Reported on 6/29/2023)      glimepiride (AMARYL) 2 MG tablet Take 1 tablet by mouth Every Morning Before Breakfast. (Patient not taking: Reported on 1/23/2024)      hydroCHLOROthiazide (HYDRODIURIL) 12.5 MG tablet Take 2 tablets by mouth Daily. (Patient not taking: Reported on 6/29/2023) 30 tablet 11    metFORMIN (GLUCOPHAGE) 500 MG tablet Take 500 mg by mouth 2 (Two) Times a Day With Meals. (Patient not taking: Reported on 6/29/2023)      Turmeric 500 MG capsule Take  by mouth Daily. (Patient not taking: Reported on 6/29/2023)      venlafaxine (EFFEXOR) 37.5 MG tablet Take 37.5 mg by mouth 2 (Two) Times a Day. (Patient not taking: Reported on 6/29/2023)       No current  "facility-administered medications for this visit.        No Known Allergies    Objective     /74 (BP Location: Left arm, Patient Position: Sitting, Cuff Size: Large Adult)   Pulse 89   Ht 180.3 cm (71\")   Wt (!) 150 kg (331 lb 9.6 oz)   SpO2 98%   BMI 46.25 kg/m²     Physical Exam       DATA:  Results for orders placed during the hospital encounter of 06/15/17    SCANNED - TELEMETRY     Results for orders placed during the hospital encounter of 10/13/21    Adult Transthoracic Echo Complete W/ Cont if Necessary Per Protocol    Interpretation Summary  · Left ventricular ejection fraction appears to be 56 - 60%. Left ventricular systolic function is normal.  · Left ventricular diastolic function is consistent with (grade I) impaired relaxation.  · No significant functional valvular abnormalities noted  · There is no evidence of pericardial effusion.     Results for orders placed during the hospital encounter of 06/15/17    Cardiac Catheterization/Vascular Study    Narrative  · Right dominant coronary artery system with a patent stent in the proximal LAD    Final impression:  Right dominant coronary artery system without hemodynamically significant coronary artery disease and widely patent stent in the proximal LAD.    Procedures performed:  Coronary angiography    History of present illness:  Mr. Roth is a pleasant 72-year-old gentleman who resented to the hospital earlier today with an ST elevation myocardial infarction in the anterior territory.  He underwent drug-eluting stent implantation to the proximal LAD without complication.  He had recurrent chest discomfort postprocedure.  As such, he was brought back to the cardiac catheterization laboratory.  Prior to the procedure the patient was given informed consent apprising him of the risk of heart attack, stroke and death.  The patient understood the risks and agreed to proceed.    Procedure in detail:  The patient was brought to the cardiac " catheterization laboratory and prepped and draped in the usual sterile fashion.  Adequate anesthesia was obtained by infiltrating the right groin with local lidocaine.  The prior sheath was exchanged over wire and a 5 [place]yanese JL4 catheter was used to engage the ostium of the left main coronary artery.  Angiography was performed in varying views using hand injection of contrast material.  At the conclusion of the procedure the patient was returned to the floor without evidence of complication.  Sheath was pulled using manual compression.    Angiographic findings in detail:  Left Main coronary artery:  Left main coronary artery is a large vessel which bifurcates into the LAD and circumflex arteries.  The left main coronary artery is free of atherosclerotic disease.    Left anterior descending artery:  Left anterior descending artery is a large vessel which reaches beyond the apex the ventricle and gives rise to 2 moderate-sized diagonal branches.  There is a previously implanted stent in the proximal LAD which is widely patent without evidence of in-stent thrombosis or itch dissection.  There is GWENDOLYN-3 flow in the distal vascular bed.  The first diagonal branch is noted to have a 60% lesion in its ostium and its midsegment.    Circumflex artery:  The circumflex artery is a large vessel which gives rise to 3 obtuse marginal branches the first 2 of which are large and the third of which is small.  There is a 30% lesion in the midportion circumflex artery and a 30% lesion noted in the proximal portion of the first obtuse marginal branch.    Final impression and plan:  Overall it is my impression that Mr. Roth has undergone successful percutaneous revascularization as previously described.  His stent is widely patent without evidence of thrombosis.  He will undergo typical management post myocardial infarction.    Procedures     Lab Results   Component Value Date    CHOL 171 06/16/2017    CHOL 235 (H) 06/15/2017     Lab  "Results   Component Value Date    TRIG 104 06/16/2017    TRIG 171 (H) 06/15/2017     Lab Results   Component Value Date    HDL 31 (L) 06/16/2017    HDL 42 (L) 06/15/2017     Lab Results   Component Value Date     (H) 06/16/2017     (H) 06/15/2017       Lab Results   Component Value Date    TSH 1.797 06/16/2017               Invalid input(s): \"LABALBU\", \"PROT\"        Assessment and Plan    Assessment and Plan    Problem List Items Addressed This Visit          Cardiac and Vasculature    Coronary artery disease involving native coronary artery of native heart without angina pectoris    Relevant Orders    Adult Transthoracic Echo Complete w/ Color, Spectral and Contrast if necessary per protocol    Ischemic cardiomyopathy    Essential hypertension - Primary    Relevant Orders    Adult Transthoracic Echo Complete w/ Color, Spectral and Contrast if necessary per protocol    Mixed hyperlipidemia           Recommended increase activity to 30 minutes of walking daily, most days of the week    Thank you for allowing me to participate in the care of Sofia Roth. Feel free to contact me directly with any further questions or concerns.          Phani Acevedo MD, FACC  Interventional Cardiology    "

## 2024-04-04 ENCOUNTER — HOSPITAL ENCOUNTER (OUTPATIENT)
Dept: CARDIOLOGY | Facility: HOSPITAL | Age: 79
Discharge: HOME | End: 2024-04-04
Admitting: INTERNAL MEDICINE
Payer: MEDICARE

## 2024-04-04 DIAGNOSIS — I10 ESSENTIAL HYPERTENSION: ICD-10-CM

## 2024-04-04 DIAGNOSIS — I25.10 CORONARY ARTERY DISEASE INVOLVING NATIVE CORONARY ARTERY OF NATIVE HEART WITHOUT ANGINA PECTORIS: ICD-10-CM

## 2024-04-04 LAB
BH CV ECHO MEAS - ACS: 1.4 CM
BH CV ECHO MEAS - AO MAX PG: 5 MMHG
BH CV ECHO MEAS - AO MEAN PG: 3 MMHG
BH CV ECHO MEAS - AO ROOT DIAM: 3.8 CM
BH CV ECHO MEAS - AO V2 MAX: 112 CM/SEC
BH CV ECHO MEAS - AO V2 VTI: 21.2 CM
BH CV ECHO MEAS - EDV(CUBED): 91.1 ML
BH CV ECHO MEAS - EDV(MOD-SP4): 62.6 ML
BH CV ECHO MEAS - EF(MOD-SP4): 44.1 %
BH CV ECHO MEAS - ESV(CUBED): 39.3 ML
BH CV ECHO MEAS - ESV(MOD-SP4): 35 ML
BH CV ECHO MEAS - FS: 24.4 %
BH CV ECHO MEAS - IVS/LVPW: 0.92 CM
BH CV ECHO MEAS - IVSD: 1.2 CM
BH CV ECHO MEAS - LA DIMENSION: 4.9 CM
BH CV ECHO MEAS - LAT PEAK E' VEL: 8.3 CM/SEC
BH CV ECHO MEAS - LV DIASTOLIC VOL/BSA (35-75): 24 CM2
BH CV ECHO MEAS - LV MASS(C)D: 210.2 GRAMS
BH CV ECHO MEAS - LV SYSTOLIC VOL/BSA (12-30): 13.4 CM2
BH CV ECHO MEAS - LVIDD: 4.5 CM
BH CV ECHO MEAS - LVIDS: 3.4 CM
BH CV ECHO MEAS - LVOT AREA: 3.1 CM2
BH CV ECHO MEAS - LVOT DIAM: 2 CM
BH CV ECHO MEAS - LVPWD: 1.3 CM
BH CV ECHO MEAS - MED PEAK E' VEL: 4 CM/SEC
BH CV ECHO MEAS - MV A MAX VEL: 92.1 CM/SEC
BH CV ECHO MEAS - MV E MAX VEL: 40.1 CM/SEC
BH CV ECHO MEAS - MV E/A: 0.44
BH CV ECHO MEAS - PA ACC TIME: 0.09 SEC
BH CV ECHO MEAS - SI(MOD-SP4): 10.6 ML/M2
BH CV ECHO MEAS - SV(MOD-SP4): 27.6 ML
BH CV ECHO MEAS - TAPSE (>1.6): 2.8 CM
BH CV ECHO MEASUREMENTS AVERAGE E/E' RATIO: 6.52
LEFT ATRIUM VOLUME INDEX: 12.8 ML/M2

## 2024-04-04 PROCEDURE — 93306 TTE W/DOPPLER COMPLETE: CPT

## 2024-04-05 ENCOUNTER — TELEPHONE (OUTPATIENT)
Dept: CARDIOLOGY | Facility: CLINIC | Age: 79
End: 2024-04-05
Payer: MEDICARE

## 2024-04-05 NOTE — TELEPHONE ENCOUNTER
Spoke to patient regarding test results. He has been scheduled to  see Dr Christianson on 04/08/24 and will keep that appointment.

## 2024-04-05 NOTE — TELEPHONE ENCOUNTER
----- Message from DENICE Sparks sent at 4/4/2024 11:32 PM EDT -----  Please call the patient regarding his abnormal result.    Patient will need an appointment next week with Dr Christianson.      Please tell him that there was significant change in his echocardiogram since the last one was done in 2022.  Although the squeezing/pumping function of the left ventricle was normal there are new changes in the left ventricle muscle.  He will need to talk with Dr Christianson regarding this and Dr Christianson can adjust some medications for him or order further testing as appropriate.      Thanks, Magda

## 2024-04-08 ENCOUNTER — OFFICE VISIT (OUTPATIENT)
Dept: CARDIOLOGY | Facility: CLINIC | Age: 79
End: 2024-04-08
Payer: MEDICARE

## 2024-04-08 VITALS
SYSTOLIC BLOOD PRESSURE: 138 MMHG | HEART RATE: 79 BPM | DIASTOLIC BLOOD PRESSURE: 85 MMHG | BODY MASS INDEX: 43.68 KG/M2 | OXYGEN SATURATION: 95 % | WEIGHT: 312 LBS | HEIGHT: 71 IN

## 2024-04-08 DIAGNOSIS — I15.0 RENOVASCULAR HYPERTENSION: ICD-10-CM

## 2024-04-08 DIAGNOSIS — I25.85 CHRONIC CORONARY MICROVASCULAR DYSFUNCTION: Primary | ICD-10-CM

## 2024-04-08 DIAGNOSIS — E11.9 TYPE 2 DIABETES MELLITUS WITHOUT COMPLICATION, WITHOUT LONG-TERM CURRENT USE OF INSULIN: ICD-10-CM

## 2024-04-08 PROCEDURE — 3079F DIAST BP 80-89 MM HG: CPT | Performed by: INTERNAL MEDICINE

## 2024-04-08 PROCEDURE — 3075F SYST BP GE 130 - 139MM HG: CPT | Performed by: INTERNAL MEDICINE

## 2024-04-08 PROCEDURE — 99212 OFFICE O/P EST SF 10 MIN: CPT | Performed by: INTERNAL MEDICINE

## 2024-04-08 NOTE — PROGRESS NOTES
Office Note    Subjective     Sofia Roth is a 79 y.o. male who presents to day for follow-up post echo      Active Problems:  Problem List Items Addressed This Visit          Cardiac and Vasculature    Renovascular hypertension       Endocrine and Metabolic    Type 2 diabetes mellitus without complication, without long-term current use of insulin       Other    Chronic coronary microvascular dysfunction - Primary       HPI  Patient is a pleasant 79-year-old gentleman past medical history significant for coronary disease s/p PTCA and stent placement to the LAD in June 2017.  Patient has been doing well.  Patient had a recent echo done on January 23, 2024.  EF was normal.  Has mild diastolic dysfunction.  Patient has any chest pains or breathing problems.  Patient has lower extremity edema.  Denies any palpitations nausea vomiting.    PRIOR MEDS  Current Outpatient Medications on File Prior to Visit   Medication Sig Dispense Refill    aspirin 81 MG EC tablet Take 1 tablet by mouth Daily. 30 tablet 11    atorvastatin (LIPITOR) 40 MG tablet TAKE 1 TABLET BY MOUTH ONCE DAILY 90 tablet 3    clopidogrel (PLAVIX) 75 MG tablet Take 1 tablet by mouth Daily. 30 tablet 11    Dulaglutide 1.5 MG/0.5ML solution pen-injector Inject 3 mg under the skin into the appropriate area as directed 1 (One) Time Per Week.      escitalopram (LEXAPRO) 10 MG tablet 0.5 tablets.      glimepiride (AMARYL) 4 MG tablet Take 1 tablet by mouth Daily.      Jardiance 25 MG tablet tablet Take 1 tablet by mouth Every Morning.      omeprazole (priLOSEC) 20 MG capsule Take 1 capsule by mouth Daily.      [DISCONTINUED] carvedilol (COREG) 12.5 MG tablet TAKE ONE TABLET BY MOUTH TWICE A DAY WITH A MEAL (Patient taking differently: 0.5 tablets.) 180 tablet 3    [DISCONTINUED] lisinopril (PRINIVIL,ZESTRIL) 10 MG tablet Take 1 tablet by mouth Daily. (Patient taking differently: Take 0.5 tablets by mouth Daily.) 30 tablet 11    [DISCONTINUED] ARIPiprazole  (ABILIFY) 5 MG tablet Take 5 mg by mouth Daily. (Patient not taking: Reported on 6/29/2023)      [DISCONTINUED] glimepiride (AMARYL) 2 MG tablet Take 1 tablet by mouth Every Morning Before Breakfast. (Patient not taking: Reported on 1/23/2024)      [DISCONTINUED] hydroCHLOROthiazide (HYDRODIURIL) 12.5 MG tablet Take 2 tablets by mouth Daily. (Patient not taking: Reported on 6/29/2023) 30 tablet 11    [DISCONTINUED] metFORMIN (GLUCOPHAGE) 500 MG tablet Take 500 mg by mouth 2 (Two) Times a Day With Meals. (Patient not taking: Reported on 6/29/2023)      [DISCONTINUED] Turmeric 500 MG capsule Take  by mouth Daily. (Patient not taking: Reported on 6/29/2023)      [DISCONTINUED] venlafaxine (EFFEXOR) 37.5 MG tablet Take 37.5 mg by mouth 2 (Two) Times a Day. (Patient not taking: Reported on 6/29/2023)       No current facility-administered medications on file prior to visit.       ALLERGIES  Patient has no known allergies.    HISTORY  Past Medical History:   Diagnosis Date    Arthritis     Coronary artery disease     Diabetes mellitus     Essential hypertension 8/7/2017       Social History     Socioeconomic History    Marital status:    Tobacco Use    Smoking status: Former     Current packs/day: 1.00     Average packs/day: 1 pack/day for 15.0 years (15.0 ttl pk-yrs)     Types: Cigarettes, Pipe, Cigars    Smokeless tobacco: Former     Types: Chew   Vaping Use    Vaping status: Never Used   Substance and Sexual Activity    Alcohol use: No    Drug use: No    Sexual activity: Defer       Family History   Problem Relation Age of Onset    Cancer Mother     Early death Mother     Heart disease Father     Diabetes Sister     Osteoarthritis Sister     Osteoporosis Sister     Heart disease Sister     Heart disease Paternal Uncle     Diabetes Maternal Grandfather        Review of Systems   Respiratory:  Negative for cough, chest tightness and shortness of breath.    Cardiovascular:  Positive for leg swelling. Negative for  "chest pain.   Neurological:  Negative for dizziness, seizures, speech difficulty and light-headedness.       Objective     VITALS: /85 (BP Location: Left arm, Patient Position: Sitting, Cuff Size: Adult)   Pulse 79   Ht 180.3 cm (71\")   Wt (!) 142 kg (312 lb)   SpO2 95%   BMI 43.52 kg/m²     LABS:   Hospital Outpatient Visit on 04/04/2024   Component Date Value Ref Range Status    LVIDd 04/04/2024 4.5  cm Final    LVIDs 04/04/2024 3.4  cm Final    IVSd 04/04/2024 1.20  cm Final    LVPWd 04/04/2024 1.30  cm Final    FS 04/04/2024 24.4  % Final    IVS/LVPW 04/04/2024 0.92  cm Final    ESV(cubed) 04/04/2024 39.3  ml Final    LV Sys Vol (BSA corrected) 04/04/2024 13.4  cm2 Final    EDV(cubed) 04/04/2024 91.1  ml Final    LV Garcia Vol (BSA corrected) 04/04/2024 24.0  cm2 Final    LV mass(C)d 04/04/2024 210.2  grams Final    LVOT area 04/04/2024 3.1  cm2 Final    LVOT diam 04/04/2024 2.00  cm Final    EDV(MOD-sp4) 04/04/2024 62.6  ml Final    ESV(MOD-sp4) 04/04/2024 35.0  ml Final    SV(MOD-sp4) 04/04/2024 27.6  ml Final    SI(MOD-sp4) 04/04/2024 10.6  ml/m2 Final    EF(MOD-sp4) 04/04/2024 44.1  % Final    MV E max kelvin 04/04/2024 40.1  cm/sec Final    MV A max kelvin 04/04/2024 92.1  cm/sec Final    MV E/A 04/04/2024 0.44   Final    LA ESV Index (BP) 04/04/2024 12.8  ml/m2 Final    Med Peak E' Kelvin 04/04/2024 4.0  cm/sec Final    Lat Peak E' Kelvin 04/04/2024 8.3  cm/sec Final    Avg E/e' ratio 04/04/2024 6.52   Final    TAPSE (>1.6) 04/04/2024 2.8  cm Final    LA dimension (2D)  04/04/2024 4.9  cm Final    Ao pk kelvin 04/04/2024 112.0  cm/sec Final    Ao max PG 04/04/2024 5.0  mmHg Final    Ao mean PG 04/04/2024 3.0  mmHg Final    Ao V2 VTI 04/04/2024 21.2  cm Final    PA acc time 04/04/2024 0.09  sec Final    Ao root diam 04/04/2024 3.8  cm Final    ACS 04/04/2024 1.40  cm Final         IMAGING:   No Images in the past 120 days found..  No results found for this or any previous visit.     No results found for this or " any previous visit.          EXAM:  Constitutional:       General: Awake.      Appearance: Healthy appearance. Not in distress.   Eyes:      Conjunctiva/sclera: Conjunctivae normal.   HENT:      Head: Normocephalic and atraumatic.      Nose: Nose normal.    Mouth/Throat:      Pharynx: Oropharynx is clear.   Neck:      Thyroid: Thyroid normal.      Vascular: No carotid bruit or JVD.   Pulmonary:      Effort: Pulmonary effort is normal.      Breath sounds: Normal breath sounds.   Chest:      Chest wall: Not tender to palpatation.   Cardiovascular:      PMI at left midclavicular line. Normal rate. Regular rhythm. Normal S1 with normal intensity. Normal S2 with normal intensity.       Murmurs: There is no murmur.      No gallop.  No rub.   Pulses:     Intact distal pulses.   Edema:     Peripheral edema present.     Feet: bilateral 1+ edema of the feet.  Abdominal:      General: Bowel sounds are normal. There is no distension.      Palpations: Abdomen is soft. There is no hepatomegaly.      Tenderness: There is no abdominal tenderness.   Musculoskeletal: Normal range of motion.      Cervical back: Normal range of motion. Skin:     General: Skin is warm and dry. There is no cyanosis.      Coloration: Skin is not jaundiced.   Neurological:      Mental Status: Alert and oriented to person, place and time.      Motor: Motor function is intact.      Gait: Gait is intact.   Psychiatric:         Attention and Perception: Attention and perception normal.         Speech: Speech normal.         Behavior: Behavior is cooperative.         Cognition and Memory: Cognition and memory normal.         Judgment: Judgment normal.          Procedure   Procedures       Assessment & Plan     Diagnoses and all orders for this visit:    1. Chronic coronary microvascular dysfunction (Primary)    2. Renovascular hypertension    3. Type 2 diabetes mellitus without complication, without long-term current use of insulin          PLAN  #1 cardiac.   Patient to continue risk factor modification.  Patient is on aspirin and Plavix.  He is tolerating it well will continue same  2. Hypertension.  Patient blood pressure is stable  #3 hyperlipidemia continue current medication           MEDS ORDERED DURING VISIT:    Medications Discontinued During This Encounter   Medication Reason    ARIPiprazole (ABILIFY) 5 MG tablet *Therapy completed    carvedilol (COREG) 12.5 MG tablet *Therapy completed    glimepiride (AMARYL) 2 MG tablet *Therapy completed    hydroCHLOROthiazide (HYDRODIURIL) 12.5 MG tablet *Therapy completed    lisinopril (PRINIVIL,ZESTRIL) 10 MG tablet *Therapy completed    metFORMIN (GLUCOPHAGE) 500 MG tablet *Therapy completed    venlafaxine (EFFEXOR) 37.5 MG tablet *Therapy completed    Turmeric 500 MG capsule *Therapy completed    metFORMIN (GLUCOPHAGE) 500 MG tablet *Therapy completed        No orders of the defined types were placed in this encounter.        Follow Up:   Return in about 8 months (around 12/8/2024) for Recheck.    Patient was given instructions and counseling regarding his condition or for health maintenance advice. Please see specific information pulled into the AVS if appropriate.   As always, Tabatha Mosqueda APRN  I appreciate very much the opportunity to participate in the cardiovascular care of your patients. Please do not hesitate to call me with any questions with regards to Sofia Roth evaluation and management.         This document has been electronically signed by Yesenia Christianson MD [facility], Interventional Cardiology  April 8, 2024 15:53 EDT    Dictated Utilizing Dragon Dictation: Part of this note may be an electronic transcription/translation of spoken language to printed text using the Dragon Dictation System.

## 2024-12-16 ENCOUNTER — OFFICE VISIT (OUTPATIENT)
Dept: CARDIOLOGY | Facility: CLINIC | Age: 79
End: 2024-12-16
Payer: MEDICARE

## 2024-12-16 VITALS — HEIGHT: 71 IN | BODY MASS INDEX: 43.52 KG/M2 | RESPIRATION RATE: 18 BRPM

## 2024-12-16 DIAGNOSIS — I25.10 CORONARY ARTERY DISEASE INVOLVING NATIVE CORONARY ARTERY OF NATIVE HEART WITHOUT ANGINA PECTORIS: Primary | ICD-10-CM

## 2024-12-16 DIAGNOSIS — M17.0 ARTHRITIS OF BOTH KNEES: ICD-10-CM

## 2024-12-16 DIAGNOSIS — E78.2 MIXED HYPERLIPIDEMIA: ICD-10-CM

## 2024-12-16 DIAGNOSIS — I10 ESSENTIAL HYPERTENSION: ICD-10-CM

## 2024-12-16 DIAGNOSIS — I21.02 ST ELEVATION MYOCARDIAL INFARCTION INVOLVING LEFT ANTERIOR DESCENDING (LAD) CORONARY ARTERY: ICD-10-CM

## 2024-12-16 RX ORDER — VENLAFAXINE HYDROCHLORIDE 37.5 MG/1
1 CAPSULE, EXTENDED RELEASE ORAL DAILY
COMMUNITY
Start: 2024-12-15

## 2024-12-16 RX ORDER — ARIPIPRAZOLE 5 MG/1
5 TABLET ORAL DAILY
COMMUNITY
Start: 2024-12-15

## 2024-12-16 RX ORDER — CARVEDILOL 6.25 MG/1
1 TABLET ORAL 2 TIMES DAILY WITH MEALS
COMMUNITY
Start: 2024-10-28

## 2024-12-16 RX ORDER — LISINOPRIL 5 MG/1
5 TABLET ORAL DAILY
COMMUNITY
Start: 2024-11-04

## 2024-12-16 NOTE — PROGRESS NOTES
Office Note    Subjective     Sofia Roth is a 79 y.o. male who presents to day for follow up      Active Problems:  Problem List Items Addressed This Visit          Cardiac and Vasculature    Coronary artery disease involving native coronary artery of native heart without angina pectoris - Primary    Relevant Medications    carvedilol (COREG) 6.25 MG tablet    Essential hypertension    Relevant Medications    carvedilol (COREG) 6.25 MG tablet    lisinopril (PRINIVIL,ZESTRIL) 5 MG tablet    Mixed hyperlipidemia    RESOLVED: STEMI (ST elevation myocardial infarction)    Relevant Medications    carvedilol (COREG) 6.25 MG tablet       Musculoskeletal and Injuries    Arthritis of both knees       HPI  Patient is a pleasant 79-year-old gentleman past medical history significant for coronary disease s/p PTCA and stent placement to the LAD in June 2017. Patient has been doing well. Patient had a recent echo done on January 23, 2024. EF was normal. Has mild diastolic dysfunction. Patient has any chest pains or breathing problems. Patient has lower extremity edema. Denies any palpitations nausea vomiting.     PRIOR MEDS  Current Outpatient Medications on File Prior to Visit   Medication Sig Dispense Refill    ARIPiprazole (ABILIFY) 5 MG tablet Take 1 tablet by mouth Daily.      aspirin 81 MG EC tablet Take 1 tablet by mouth Daily. 30 tablet 11    atorvastatin (LIPITOR) 40 MG tablet TAKE 1 TABLET BY MOUTH ONCE DAILY 90 tablet 3    carvedilol (COREG) 6.25 MG tablet Take 1 tablet by mouth 2 (Two) Times a Day With Meals.      clopidogrel (PLAVIX) 75 MG tablet Take 1 tablet by mouth Daily. 30 tablet 11    Dulaglutide 1.5 MG/0.5ML solution pen-injector Inject 3 mg under the skin into the appropriate area as directed 1 (One) Time Per Week.      escitalopram (LEXAPRO) 10 MG tablet 0.5 tablets.      glimepiride (AMARYL) 4 MG tablet Take 1 tablet by mouth Daily.      lisinopril (PRINIVIL,ZESTRIL) 5 MG tablet Take 1 tablet by mouth  "Daily.      omeprazole (priLOSEC) 20 MG capsule Take 1 capsule by mouth Daily.      SITagliptin (JANUVIA) 25 MG tablet Take 1 tablet by mouth Daily.      venlafaxine XR (EFFEXOR-XR) 37.5 MG 24 hr capsule Take 1 capsule by mouth Daily.      [DISCONTINUED] Jardiance 25 MG tablet tablet Take 1 tablet by mouth Every Morning. (Patient not taking: Reported on 12/16/2024)       No current facility-administered medications on file prior to visit.       ALLERGIES  Patient has no known allergies.    HISTORY  Past Medical History:   Diagnosis Date    Arthritis     Coronary artery disease     Diabetes mellitus     Essential hypertension 8/7/2017       Social History     Socioeconomic History    Marital status:    Tobacco Use    Smoking status: Former     Current packs/day: 1.00     Average packs/day: 1 pack/day for 15.0 years (15.0 ttl pk-yrs)     Types: Cigarettes, Pipe, Cigars    Smokeless tobacco: Former     Types: Chew   Vaping Use    Vaping status: Never Used   Substance and Sexual Activity    Alcohol use: No    Drug use: No    Sexual activity: Defer       Family History   Problem Relation Age of Onset    Cancer Mother     Early death Mother     Heart disease Father     Diabetes Sister     Osteoarthritis Sister     Osteoporosis Sister     Heart disease Sister     Heart disease Paternal Uncle     Diabetes Maternal Grandfather        Review of Systems   Respiratory:  Negative for apnea and chest tightness.    Cardiovascular:  Negative for chest pain, palpitations and leg swelling.   Musculoskeletal:  Positive for arthralgias. Negative for back pain and gait problem.   Neurological:  Negative for dizziness, seizures, syncope, speech difficulty, light-headedness and headaches.       Objective     VITALS: Resp 18   Ht 180.3 cm (71\")   BMI 43.52 kg/m²     LABS:   No visits with results within 3 Month(s) from this visit.   Latest known visit with results is:   Hospital Outpatient Visit on 04/04/2024   Component Date " Value Ref Range Status    LVIDd 04/04/2024 4.5  cm Final    LVIDs 04/04/2024 3.4  cm Final    IVSd 04/04/2024 1.20  cm Final    LVPWd 04/04/2024 1.30  cm Final    FS 04/04/2024 24.4  % Final    IVS/LVPW 04/04/2024 0.92  cm Final    ESV(cubed) 04/04/2024 39.3  ml Final    LV Sys Vol (BSA corrected) 04/04/2024 13.4  cm2 Final    EDV(cubed) 04/04/2024 91.1  ml Final    LV Garcia Vol (BSA corrected) 04/04/2024 24.0  cm2 Final    LV mass(C)d 04/04/2024 210.2  grams Final    LVOT area 04/04/2024 3.1  cm2 Final    LVOT diam 04/04/2024 2.00  cm Final    EDV(MOD-sp4) 04/04/2024 62.6  ml Final    ESV(MOD-sp4) 04/04/2024 35.0  ml Final    SV(MOD-sp4) 04/04/2024 27.6  ml Final    SVi(MOD-SP4) 04/04/2024 10.6  ml/m2 Final    EF(MOD-sp4) 04/04/2024 44.1  % Final    MV E max kelvin 04/04/2024 40.1  cm/sec Final    MV A max kelvin 04/04/2024 92.1  cm/sec Final    MV E/A 04/04/2024 0.44   Final    LA ESV Index (BP) 04/04/2024 12.8  ml/m2 Final    Med Peak E' Kelvin 04/04/2024 4.0  cm/sec Final    Lat Peak E' Kelvin 04/04/2024 8.3  cm/sec Final    Avg E/e' ratio 04/04/2024 6.52   Final    TAPSE (>1.6) 04/04/2024 2.8  cm Final    LA dimension (2D)  04/04/2024 4.9  cm Final    Ao pk kelvin 04/04/2024 112.0  cm/sec Final    Ao max PG 04/04/2024 5.0  mmHg Final    Ao mean PG 04/04/2024 3.0  mmHg Final    Ao V2 VTI 04/04/2024 21.2  cm Final    PA acc time 04/04/2024 0.09  sec Final    Ao root diam 04/04/2024 3.8  cm Final    ACS 04/04/2024 1.40  cm Final         IMAGING:   No Images in the past 120 days found..  No results found for this or any previous visit.     No results found for this or any previous visit.          EXAM:  Constitutional:       General: Awake.      Appearance: Healthy appearance. Not in distress.   Eyes:      Conjunctiva/sclera: Conjunctivae normal.   HENT:      Head: Normocephalic and atraumatic.      Nose: Nose normal.    Mouth/Throat:      Pharynx: Oropharynx is clear.   Neck:      Thyroid: Thyroid normal.      Vascular: No  carotid bruit or JVD.   Pulmonary:      Effort: Pulmonary effort is normal.      Breath sounds: Normal breath sounds.   Chest:      Chest wall: Not tender to palpatation.   Cardiovascular:      PMI at left midclavicular line. Normal rate. Regular rhythm. Normal S1 with normal intensity. Normal S2 with normal intensity.       Murmurs: There is no murmur.      No gallop.  No rub.   Pulses:     Intact distal pulses.   Edema:     Peripheral edema absent.   Abdominal:      General: Bowel sounds are normal. There is no distension.      Palpations: Abdomen is soft. There is no hepatomegaly.      Tenderness: There is no abdominal tenderness.   Musculoskeletal: Normal range of motion.      Cervical back: Normal range of motion. Skin:     General: Skin is warm and dry. There is no cyanosis.      Coloration: Skin is not jaundiced.   Neurological:      Mental Status: Alert and oriented to person, place and time.      Motor: Motor function is intact.      Gait: Gait is intact.   Psychiatric:         Attention and Perception: Attention and perception normal.         Speech: Speech normal.         Behavior: Behavior is cooperative.         Cognition and Memory: Cognition and memory normal.         Judgment: Judgment normal.          Procedure     ECG 12 Lead    Date/Time: 12/16/2024 3:20 PM  Performed by: Yesenia Christianson MD    Authorized by: Yesenia Christianson MD  Comparison: compared with previous ECG from 6/16/2017  Similar to previous ECG  Comments: Sinus rhythm at 80, left axis, old inferior, old anterolateral infarction             Assessment & Plan     Diagnoses and all orders for this visit:    1. Coronary artery disease involving native coronary artery of native heart without angina pectoris (Primary)    2. Essential hypertension    3. Mixed hyperlipidemia    4. ST elevation myocardial infarction involving left anterior descending (LAD) coronary artery    5. Arthritis of both knees    Other orders  -     ECG 12  Lead          PLAN  #1 cardiac.  Patient with history of coronary artery with PCI done to the LAD in 2017.  Last echo in January 2024 showed normal EF.  Patient is not having any angina symptoms.  Continue to follow closely and clinically.  Risk factor modification for coronary disease to continue.  Patient is on Plavix and aspirin.  Can consider coming off Plavix on next visit.  #2 hyperlipidemia.  Continue lipid-lowering medications.  #3 hypertension.  Patient blood pressure stable on current medication.  Continue same.           MEDS ORDERED DURING VISIT:    Medications Discontinued During This Encounter   Medication Reason    Jardiance 25 MG tablet tablet *Therapy completed        No orders of the defined types were placed in this encounter.        Follow Up:   Return in about 6 months (around 6/16/2025) for Recheck.    Patient was given instructions and counseling regarding his condition or for health maintenance advice. Please see specific information pulled into the AVS if appropriate.   As always, Tabatha Mosqueda APRN  I appreciate very much the opportunity to participate in the cardiovascular care of your patients. Please do not hesitate to call me with any questions with regards to Sofia Roth evaluation and management.         This document has been electronically signed by Yesenia Christianson MD [facility], Interventional Cardiology  December 16, 2024 15:22 EST    Dictated Utilizing Dragon Dictation: Part of this note may be an electronic transcription/translation of spoken language to printed text using the Dragon Dictation System.

## 2025-06-23 ENCOUNTER — OFFICE VISIT (OUTPATIENT)
Dept: CARDIOLOGY | Facility: CLINIC | Age: 80
End: 2025-06-23
Payer: MEDICARE

## 2025-06-23 VITALS
SYSTOLIC BLOOD PRESSURE: 124 MMHG | BODY MASS INDEX: 42 KG/M2 | WEIGHT: 300 LBS | DIASTOLIC BLOOD PRESSURE: 85 MMHG | OXYGEN SATURATION: 98 % | HEIGHT: 71 IN | HEART RATE: 83 BPM

## 2025-06-23 DIAGNOSIS — E78.2 MIXED HYPERLIPIDEMIA: ICD-10-CM

## 2025-06-23 DIAGNOSIS — E11.9 TYPE 2 DIABETES MELLITUS WITHOUT COMPLICATION, WITHOUT LONG-TERM CURRENT USE OF INSULIN: ICD-10-CM

## 2025-06-23 DIAGNOSIS — I25.10 CORONARY ARTERY DISEASE INVOLVING NATIVE CORONARY ARTERY OF NATIVE HEART WITHOUT ANGINA PECTORIS: Primary | ICD-10-CM

## 2025-06-23 DIAGNOSIS — I10 ESSENTIAL HYPERTENSION: ICD-10-CM

## 2025-06-23 DIAGNOSIS — E66.813 OBESITY, CLASS III, BMI 40-49.9 (MORBID OBESITY): ICD-10-CM

## 2025-06-23 NOTE — PROGRESS NOTES
Office Note    Subjective     Sofia Roth is a 80 y.o. male who presents to day for follow up      Active Problems:  Problem List Items Addressed This Visit          Cardiac and Vasculature    Coronary artery disease involving native coronary artery of native heart without angina pectoris - Primary    Essential hypertension    Mixed hyperlipidemia       Endocrine and Metabolic    Obesity, Class III, BMI 40-49.9 (morbid obesity)    Type 2 diabetes mellitus without complication, without long-term current use of insulin         HPI    Sofia Roth comes in for follow-up.  He denies any dizziness or syncopal episodes.  He denies any exertional chest pain or shortness of breath.  He does not exercise on a regular basis because of bilateral knee arthritis.  He does not want knee replacement surgery.  We also reviewed his lipid profile.  His LDL was around 80.  He is not interested in adding Zetia or any other modification to his medication regimen at this time.  He has history of chronic diarrhea but has not had a colonoscopy.  He said he declined it.  Denies any blood in stool.  He has not tried bulking agents such as psyllium.      12/16/2024  Patient is a pleasant 79-year-old gentleman past medical history significant for coronary disease s/p PTCA and stent placement to the LAD in June 2017. Patient has been doing well. Patient had a recent echo done on January 23, 2024. EF was normal. Has mild diastolic dysfunction. Patient has any chest pains or breathing problems. Patient has lower extremity edema. Denies any palpitations nausea vomiting.     PRIOR MEDS  Current Outpatient Medications on File Prior to Visit   Medication Sig Dispense Refill    ARIPiprazole (ABILIFY) 5 MG tablet Take 1 tablet by mouth Daily.      aspirin 81 MG EC tablet Take 1 tablet by mouth Daily. 30 tablet 11    atorvastatin (LIPITOR) 40 MG tablet TAKE 1 TABLET BY MOUTH ONCE DAILY 90 tablet 3    carvedilol (COREG) 6.25 MG tablet Take 1 tablet  by mouth 2 (Two) Times a Day With Meals.      clopidogrel (PLAVIX) 75 MG tablet Take 1 tablet by mouth Daily. 30 tablet 11    Dulaglutide 1.5 MG/0.5ML solution pen-injector Inject 3 mg under the skin into the appropriate area as directed 1 (One) Time Per Week.      escitalopram (LEXAPRO) 10 MG tablet 0.5 tablets.      glimepiride (AMARYL) 4 MG tablet Take 1 tablet by mouth Daily.      lisinopril (PRINIVIL,ZESTRIL) 5 MG tablet Take 1 tablet by mouth Daily.      omeprazole (priLOSEC) 20 MG capsule Take 1 capsule by mouth Daily.      SITagliptin (JANUVIA) 25 MG tablet Take 1 tablet by mouth Daily.      venlafaxine XR (EFFEXOR-XR) 37.5 MG 24 hr capsule Take 1 capsule by mouth Daily.       No current facility-administered medications on file prior to visit.       ALLERGIES  Patient has no known allergies.    HISTORY  Past Medical History:   Diagnosis Date    Arthritis     Coronary artery disease     Diabetes mellitus     Essential hypertension 8/7/2017       Social History     Socioeconomic History    Marital status:    Tobacco Use    Smoking status: Former     Current packs/day: 1.00     Average packs/day: 1 pack/day for 15.0 years (15.0 ttl pk-yrs)     Types: Cigarettes, Pipe, Cigars    Smokeless tobacco: Former     Types: Chew   Vaping Use    Vaping status: Never Used   Substance and Sexual Activity    Alcohol use: No    Drug use: No    Sexual activity: Defer       Family History   Problem Relation Age of Onset    Cancer Mother     Early death Mother     Heart disease Father     Diabetes Sister     Osteoarthritis Sister     Osteoporosis Sister     Heart disease Sister     Heart disease Paternal Uncle     Diabetes Maternal Grandfather        Review of Systems   Respiratory:  Negative for apnea and chest tightness.    Cardiovascular:  Negative for chest pain, palpitations and leg swelling.   Musculoskeletal:  Positive for arthralgias. Negative for back pain and gait problem.   Neurological:  Negative for  "dizziness, seizures, syncope, speech difficulty, light-headedness and headaches.       Objective     VITALS: /85   Pulse 83   Ht 180.3 cm (71\")   Wt 136 kg (300 lb)   SpO2 98%   BMI 41.84 kg/m²     LABS:   No visits with results within 3 Month(s) from this visit.   Latest known visit with results is:   Hospital Outpatient Visit on 04/04/2024   Component Date Value Ref Range Status    LVIDd 04/04/2024 4.5  cm Final    LVIDs 04/04/2024 3.4  cm Final    IVSd 04/04/2024 1.20  cm Final    LVPWd 04/04/2024 1.30  cm Final    FS 04/04/2024 24.4  % Final    IVS/LVPW 04/04/2024 0.92  cm Final    ESV(cubed) 04/04/2024 39.3  ml Final    LV Sys Vol (BSA corrected) 04/04/2024 13.4  cm2 Final    EDV(cubed) 04/04/2024 91.1  ml Final    LV Garcia Vol (BSA corrected) 04/04/2024 24.0  cm2 Final    LV mass(C)d 04/04/2024 210.2  grams Final    LVOT area 04/04/2024 3.1  cm2 Final    LVOT diam 04/04/2024 2.00  cm Final    EDV(MOD-sp4) 04/04/2024 62.6  ml Final    ESV(MOD-sp4) 04/04/2024 35.0  ml Final    SV(MOD-sp4) 04/04/2024 27.6  ml Final    SVi(MOD-SP4) 04/04/2024 10.6  ml/m2 Final    EF(MOD-sp4) 04/04/2024 44.1  % Final    MV E max kelvin 04/04/2024 40.1  cm/sec Final    MV A max kelvin 04/04/2024 92.1  cm/sec Final    MV E/A 04/04/2024 0.44   Final    LA ESV Index (BP) 04/04/2024 12.8  ml/m2 Final    Med Peak E' Kelvin 04/04/2024 4.0  cm/sec Final    Lat Peak E' Kelvin 04/04/2024 8.3  cm/sec Final    Avg E/e' ratio 04/04/2024 6.52   Final    TAPSE (>1.6) 04/04/2024 2.8  cm Final    LA dimension (2D)  04/04/2024 4.9  cm Final    Ao pk kelvin 04/04/2024 112.0  cm/sec Final    Ao max PG 04/04/2024 5.0  mmHg Final    Ao mean PG 04/04/2024 3.0  mmHg Final    Ao V2 VTI 04/04/2024 21.2  cm Final    PA acc time 04/04/2024 0.09  sec Final    Ao root diam 04/04/2024 3.8  cm Final    ACS 04/04/2024 1.40  cm Final         IMAGING:   No Images in the past 120 days found..  No results found for this or any previous visit.     No results found for this " or any previous visit.          EXAM:  Constitutional:       General: Awake.      Appearance: Healthy appearance. Not in distress. Morbidly obese.   HENT:      Head: Normocephalic and atraumatic.   Neck:      Vascular: No carotid bruit or JVD.   Pulmonary:      Effort: Pulmonary effort is normal.      Breath sounds: Normal breath sounds.   Cardiovascular:      PMI at left midclavicular line. Normal rate. Regular rhythm. Normal S1 with normal intensity. Normal S2 with normal intensity.       Murmurs: There is no murmur.      No gallop.  No rub.   Pulses:     Intact distal pulses.   Edema:     Peripheral edema absent.   Abdominal:      General: There is no distension.      Palpations: Abdomen is soft.      Tenderness: There is no abdominal tenderness.   Skin:     General: Skin is warm and dry. There is no cyanosis.      Coloration: Skin is not jaundiced.   Neurological:      Mental Status: Alert.          Procedure   Procedures       Assessment & Plan     Diagnoses and all orders for this visit:    1. Coronary artery disease involving native coronary artery of native heart without angina pectoris (Primary)    2. Essential hypertension    3. Mixed hyperlipidemia    4. Obesity, Class III, BMI 40-49.9 (morbid obesity)    5. Type 2 diabetes mellitus without complication, without long-term current use of insulin            PLAN  - Continue aspirin, Lipitor, Coreg, Plavix, lisinopril.  - Follow a good diet and exercise program.  - Weight loss recommended.  - Patient is not interested in adding Zetia to get his LDL down to less than 55.  - He will take psyllium husk for his diarrhea.  He was warned about medication interaction and advised to take him at least 4 hours before or after he takes his medications  - RTC in 6 months.           MEDS ORDERED DURING VISIT:    There are no discontinued medications.       No orders of the defined types were placed in this encounter.        Follow Up:   No follow-ups on file.    Patient  was given instructions and counseling regarding his condition or for health maintenance advice. Please see specific information pulled into the AVS if appropriate.   As always, Tabatha Mosqueda APRN  I appreciate very much the opportunity to participate in the cardiovascular care of your patients. Please do not hesitate to call me with any questions with regards to Sofia Roth evaluation and management.         This document has been electronically signed by Bishnu Arana MD [facility], Interventional Cardiology  June 23, 2025 15:47 EDT    Dictated Utilizing Dragon Dictation: Part of this note may be an electronic transcription/translation of spoken language to printed text using the Dragon Dictation System.

## (undated) DEVICE — LN INJ CONTRST FLXCIL HP F/M LL 1200PSI10

## (undated) DEVICE — ST EXT IV SMARTSITE 2VLV SP M LL 5ML IV1

## (undated) DEVICE — DRSNG SURESITE WNDW 4X4.5

## (undated) DEVICE — GC 5F 056 XB 3.5 STAND TIP: Brand: CORDIS

## (undated) DEVICE — HI-TORQUE BALANCE MIDDLEWEIGHT GUIDE WIRE W/HYDROCOAT .014 STRAIGHT TIP 3.0 CM X 190 CM: Brand: HI-TORQUE BALANCE MIDDLEWEIGHT

## (undated) DEVICE — NC TREK CORONARY DILATATION CATHETER 3.5 MM X 20 MM / RAPID-EXCHANGE: Brand: NC TREK

## (undated) DEVICE — ADULT DISPOSABLE SINGLE-PATIENT USE PULSE OXIMETER SENSOR: Brand: NONIN

## (undated) DEVICE — CANNULA,OXY,ADULT,SUPER SOFT,W/14'TUB,UC: Brand: MEDLINE INDUSTRIES, INC.

## (undated) DEVICE — Device: Brand: MEDEX

## (undated) DEVICE — GW INQWIRE FC PTFE J/3MM .035 180

## (undated) DEVICE — DEV INFL MONARCH 25W

## (undated) DEVICE — CATH F5 INF JL 4 100CM: Brand: INFINITI

## (undated) DEVICE — PK CATH CARD 70

## (undated) DEVICE — 6F .070 XB 3.5 100CM: Brand: VISTA BRITE TIP

## (undated) DEVICE — MINI TREK CORONARY DILATATION CATHETER 2.0 MM X 20 MM / RAPID-EXCHANGE: Brand: MINI TREK

## (undated) DEVICE — PAD HEMOST NEPTUNE 2X2IN

## (undated) DEVICE — CATH F5 INF PIG145 110CM 6SH: Brand: INFINITI

## (undated) DEVICE — SHEATH INTRO SUPERSHEATH JWIRE .035 6F 11CM

## (undated) DEVICE — SYR POWER FOR PRESSURE INJ SYS 150ML

## (undated) DEVICE — BG PRESS INFSR 500CC

## (undated) DEVICE — CATH F5 INF JR 4 100CM: Brand: INFINITI

## (undated) DEVICE — KT NOVA WTRANSD 60 IN

## (undated) DEVICE — ST INF PRI SMRTSTE 20DRP 2VLV 24ML 117

## (undated) DEVICE — HI-TORQUE WHISPER MS GUIDE WIRE .014 STRAIGHT TIP 3.0 CM X 190 CM: Brand: HI-TORQUE WHISPER

## (undated) DEVICE — Device